# Patient Record
Sex: MALE | Race: WHITE | NOT HISPANIC OR LATINO | Employment: OTHER | URBAN - METROPOLITAN AREA
[De-identification: names, ages, dates, MRNs, and addresses within clinical notes are randomized per-mention and may not be internally consistent; named-entity substitution may affect disease eponyms.]

---

## 2023-09-07 PROBLEM — R74.8 ABNORMAL LEVELS OF OTHER SERUM ENZYMES: Status: ACTIVE | Noted: 2023-09-07

## 2023-09-07 PROBLEM — I48.0 PAROXYSMAL A-FIB (MULTI): Status: ACTIVE | Noted: 2023-09-07

## 2023-09-07 PROBLEM — K57.10 DVRTCLOS OF SM INT W/O PERFORATION OR ABSCESS W/O BLEEDING: Status: ACTIVE | Noted: 2023-09-07

## 2023-09-07 PROBLEM — K22.89 OTHER SPECIFIED DISEASE OF ESOPHAGUS: Status: ACTIVE | Noted: 2023-09-07

## 2023-09-07 PROBLEM — R19.01 ABDOMINAL WALL MASS OF RIGHT UPPER QUADRANT: Status: ACTIVE | Noted: 2023-09-07

## 2023-09-07 PROBLEM — R93.2 ABNORMAL FINDINGS ON DIAGNOSTIC IMAGING OF LIVER AND BILIARY TRACT: Status: ACTIVE | Noted: 2023-09-07

## 2023-09-07 PROBLEM — I10 BENIGN ESSENTIAL HYPERTENSION: Status: ACTIVE | Noted: 2023-09-07

## 2023-09-07 PROBLEM — I45.10 COMPLETE RIGHT BUNDLE BRANCH BLOCK (RBBB): Status: ACTIVE | Noted: 2023-09-07

## 2023-09-07 PROBLEM — I63.9 CVA (CEREBRAL VASCULAR ACCIDENT) (MULTI): Status: ACTIVE | Noted: 2023-09-07

## 2023-09-07 PROBLEM — E78.5 DYSLIPIDEMIA: Status: ACTIVE | Noted: 2023-09-07

## 2023-09-07 PROBLEM — K80.50 CALCULUS OF BILE DUCT WITHOUT CHOLANGITIS OR CHOLECYSTITIS WITHOUT OBSTRUCTION: Status: ACTIVE | Noted: 2023-09-07

## 2023-09-07 PROBLEM — K31.1: Status: ACTIVE | Noted: 2023-09-07

## 2023-09-07 RX ORDER — PANTOPRAZOLE SODIUM 40 MG/1
1 TABLET, DELAYED RELEASE ORAL DAILY
COMMUNITY
End: 2024-04-24

## 2023-09-07 RX ORDER — RIVAROXABAN 20 MG/1
TABLET, FILM COATED ORAL
COMMUNITY
Start: 2023-04-12 | End: 2024-02-07

## 2023-09-07 RX ORDER — LISINOPRIL 20 MG/1
TABLET ORAL
COMMUNITY
Start: 2022-10-18 | End: 2023-10-12 | Stop reason: ALTCHOICE

## 2023-09-07 RX ORDER — LISINOPRIL 40 MG/1
40 TABLET ORAL DAILY
COMMUNITY
End: 2023-10-12 | Stop reason: ALTCHOICE

## 2023-09-07 RX ORDER — SIMVASTATIN 40 MG/1
1 TABLET, FILM COATED ORAL NIGHTLY
COMMUNITY

## 2023-09-07 RX ORDER — UBIDECARENONE 75 MG
1 CAPSULE ORAL DAILY
COMMUNITY

## 2023-09-07 RX ORDER — LISINOPRIL AND HYDROCHLOROTHIAZIDE 20; 25 MG/1; MG/1
1 TABLET ORAL DAILY
COMMUNITY
End: 2023-10-12 | Stop reason: ALTCHOICE

## 2023-09-07 RX ORDER — CHOLECALCIFEROL (VITAMIN D3) 25 MCG
1 TABLET ORAL DAILY
COMMUNITY

## 2023-09-07 RX ORDER — AMLODIPINE BESYLATE 5 MG/1
1 TABLET ORAL DAILY
COMMUNITY
Start: 2023-02-20

## 2023-09-07 RX ORDER — IPRATROPIUM BROMIDE 21 UG/1
SPRAY, METERED NASAL
COMMUNITY
Start: 2022-10-20

## 2023-09-07 RX ORDER — TROSPIUM CHLORIDE 20 MG/1
TABLET, FILM COATED ORAL
COMMUNITY
Start: 2022-08-16

## 2023-10-12 ENCOUNTER — OFFICE VISIT (OUTPATIENT)
Dept: CARDIOLOGY | Facility: CLINIC | Age: 88
End: 2023-10-12
Payer: MEDICARE

## 2023-10-12 VITALS
WEIGHT: 186 LBS | BODY MASS INDEX: 25.19 KG/M2 | OXYGEN SATURATION: 99 % | DIASTOLIC BLOOD PRESSURE: 70 MMHG | HEART RATE: 87 BPM | SYSTOLIC BLOOD PRESSURE: 140 MMHG | HEIGHT: 72 IN

## 2023-10-12 DIAGNOSIS — I10 BENIGN ESSENTIAL HYPERTENSION: ICD-10-CM

## 2023-10-12 DIAGNOSIS — I48.0 PAROXYSMAL A-FIB (MULTI): Primary | ICD-10-CM

## 2023-10-12 DIAGNOSIS — E78.5 DYSLIPIDEMIA: ICD-10-CM

## 2023-10-12 PROCEDURE — 3078F DIAST BP <80 MM HG: CPT | Performed by: INTERNAL MEDICINE

## 2023-10-12 PROCEDURE — 1126F AMNT PAIN NOTED NONE PRSNT: CPT | Performed by: INTERNAL MEDICINE

## 2023-10-12 PROCEDURE — 99214 OFFICE O/P EST MOD 30 MIN: CPT | Performed by: INTERNAL MEDICINE

## 2023-10-12 PROCEDURE — 1159F MED LIST DOCD IN RCRD: CPT | Performed by: INTERNAL MEDICINE

## 2023-10-12 PROCEDURE — 1160F RVW MEDS BY RX/DR IN RCRD: CPT | Performed by: INTERNAL MEDICINE

## 2023-10-12 PROCEDURE — 3077F SYST BP >= 140 MM HG: CPT | Performed by: INTERNAL MEDICINE

## 2023-10-12 PROCEDURE — 1036F TOBACCO NON-USER: CPT | Performed by: INTERNAL MEDICINE

## 2023-10-12 RX ORDER — LISINOPRIL 40 MG/1
40 TABLET ORAL DAILY
Qty: 90 TABLET | Refills: 3 | Status: SHIPPED | OUTPATIENT
Start: 2023-10-12 | End: 2024-10-11

## 2023-10-12 ASSESSMENT — ENCOUNTER SYMPTOMS
DEPRESSION: 0
OCCASIONAL FEELINGS OF UNSTEADINESS: 0
LOSS OF SENSATION IN FEET: 0

## 2023-10-12 ASSESSMENT — PAIN SCALES - GENERAL: PAINLEVEL: 0-NO PAIN
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
## 2024-02-06 DIAGNOSIS — I48.0 PAROXYSMAL A-FIB (MULTI): ICD-10-CM

## 2024-02-07 RX ORDER — RIVAROXABAN 20 MG/1
TABLET, FILM COATED ORAL
Qty: 30 TABLET | Refills: 11 | Status: SHIPPED | OUTPATIENT
Start: 2024-02-07 | End: 2024-04-02 | Stop reason: SDUPTHER

## 2024-04-02 DIAGNOSIS — I10 ESSENTIAL HYPERTENSION: ICD-10-CM

## 2024-04-02 DIAGNOSIS — I48.0 PAROXYSMAL A-FIB (MULTI): ICD-10-CM

## 2024-04-24 DIAGNOSIS — K21.00 GASTROESOPHAGEAL REFLUX DISEASE WITH ESOPHAGITIS WITHOUT HEMORRHAGE: ICD-10-CM

## 2024-04-24 RX ORDER — PANTOPRAZOLE SODIUM 40 MG/1
40 TABLET, DELAYED RELEASE ORAL DAILY
Qty: 90 TABLET | Refills: 4 | Status: SHIPPED | OUTPATIENT
Start: 2024-04-24

## 2024-06-21 DIAGNOSIS — I10 ESSENTIAL HYPERTENSION, BENIGN: ICD-10-CM

## 2024-06-21 RX ORDER — AMLODIPINE BESYLATE 5 MG/1
5 TABLET ORAL DAILY
Qty: 90 TABLET | Refills: 3 | Status: SHIPPED | OUTPATIENT
Start: 2024-06-21
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
## 2024-10-17 ENCOUNTER — APPOINTMENT (OUTPATIENT)
Dept: CARDIOLOGY | Facility: CLINIC | Age: 89
End: 2024-10-17
Payer: MEDICARE

## 2024-10-17 DIAGNOSIS — I48.0 PAROXYSMAL A-FIB (MULTI): Primary | ICD-10-CM

## 2024-10-17 DIAGNOSIS — I10 BENIGN ESSENTIAL HYPERTENSION: ICD-10-CM

## 2024-10-17 DIAGNOSIS — E78.5 DYSLIPIDEMIA: ICD-10-CM
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
## 2024-11-14 ENCOUNTER — OFFICE VISIT (OUTPATIENT)
Dept: CARDIOLOGY | Facility: CLINIC | Age: 89
End: 2024-11-14
Payer: MEDICARE

## 2024-11-14 VITALS
SYSTOLIC BLOOD PRESSURE: 130 MMHG | DIASTOLIC BLOOD PRESSURE: 68 MMHG | HEIGHT: 72 IN | OXYGEN SATURATION: 99 % | HEART RATE: 80 BPM | WEIGHT: 175 LBS | BODY MASS INDEX: 23.7 KG/M2

## 2024-11-14 DIAGNOSIS — E78.5 DYSLIPIDEMIA: ICD-10-CM

## 2024-11-14 DIAGNOSIS — I45.10 COMPLETE RIGHT BUNDLE BRANCH BLOCK (RBBB): ICD-10-CM

## 2024-11-14 DIAGNOSIS — I63.9 CEREBROVASCULAR ACCIDENT (CVA), UNSPECIFIED MECHANISM (MULTI): Primary | ICD-10-CM

## 2024-11-14 DIAGNOSIS — I48.0 PAROXYSMAL A-FIB (MULTI): ICD-10-CM

## 2024-11-14 DIAGNOSIS — I10 BENIGN ESSENTIAL HYPERTENSION: Primary | ICD-10-CM

## 2024-11-14 PROCEDURE — 3075F SYST BP GE 130 - 139MM HG: CPT | Performed by: INTERNAL MEDICINE

## 2024-11-14 PROCEDURE — 1036F TOBACCO NON-USER: CPT | Performed by: INTERNAL MEDICINE

## 2024-11-14 PROCEDURE — 1126F AMNT PAIN NOTED NONE PRSNT: CPT | Performed by: INTERNAL MEDICINE

## 2024-11-14 PROCEDURE — 1159F MED LIST DOCD IN RCRD: CPT | Performed by: INTERNAL MEDICINE

## 2024-11-14 PROCEDURE — 99214 OFFICE O/P EST MOD 30 MIN: CPT | Performed by: INTERNAL MEDICINE

## 2024-11-14 PROCEDURE — 3078F DIAST BP <80 MM HG: CPT | Performed by: INTERNAL MEDICINE

## 2024-11-14 PROCEDURE — 1160F RVW MEDS BY RX/DR IN RCRD: CPT | Performed by: INTERNAL MEDICINE

## 2024-11-14 PROCEDURE — 1157F ADVNC CARE PLAN IN RCRD: CPT | Performed by: INTERNAL MEDICINE

## 2024-11-14 ASSESSMENT — PAIN SCALES - GENERAL: PAINLEVEL_OUTOF10: 0-NO PAIN

## 2024-11-14 ASSESSMENT — ENCOUNTER SYMPTOMS
LOSS OF SENSATION IN FEET: 0
OCCASIONAL FEELINGS OF UNSTEADINESS: 1
DEPRESSION: 0

## 2024-12-12 DIAGNOSIS — I10 BENIGN ESSENTIAL HYPERTENSION: ICD-10-CM

## 2024-12-12 RX ORDER — LISINOPRIL 40 MG/1
40 TABLET ORAL DAILY
Qty: 90 TABLET | Refills: 3 | Status: SHIPPED | OUTPATIENT
Start: 2024-12-12

## 2024-12-13 ENCOUNTER — TELEPHONE (OUTPATIENT)
Dept: CARDIOLOGY | Facility: CLINIC | Age: 89
End: 2024-12-13
Payer: MEDICARE

## 2024-12-17 ENCOUNTER — APPOINTMENT (OUTPATIENT)
Dept: PHARMACY | Facility: HOSPITAL | Age: 89
End: 2024-12-17
Payer: MEDICARE

## 2025-01-06 NOTE — PROGRESS NOTES
Pharmacist Clinic: Cardiology Management    Dash García is a 89 y.o. male was referred to Clinical Pharmacy Team for anticoagulation management.     Referring Provider: Dash Quick MD    THIS IS A NEW PATIENT APPOINTMENT. PATIENT WILL BE ESTABLISHING CARE WITH CLINICAL PHARMACY.    Appointment was completed by the patient who was reached at .    REVIEW OF PAST APPNT (IF APPLICABLE):   N/A    Allergies Reviewed? 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    Personal history of malignant neoplasm of prostate     History of malignant neoplasm of prostate    Personal history of other diseases of the circulatory system 07/30/2020    History of abnormal electrocardiography    Personal history of other diseases of the circulatory system 07/30/2020    History of cardiac murmur    Personal history of other diseases of the circulatory system 07/30/2020    History of essential hypertension    Personal history of other diseases of the digestive system     History of gastrointestinal hemorrhage    Personal history of other endocrine, nutritional and metabolic disease     History of diabetes mellitus    Transient cerebral ischemic attack, unspecified 07/30/2020    TIA (transient ischemic attack)       Current Outpatient Medications on File Prior to Visit   Medication Sig Dispense Refill    amLODIPine (Norvasc) 5 mg tablet TAKE ONE TABLET BY MOUTH EVERY DAY 90 tablet 3    lisinopril 40 mg tablet TAKE ONE TABLET BY MOUTH EVERY DAY 90 tablet 3    pantoprazole (ProtoNix) 40 mg EC tablet TAKE ONE TABLET BY MOUTH EVERY DAY 90 tablet 4    rivaroxaban (Xarelto) 15 mg tablet Take 1 tablet (15 mg) by mouth once daily in the evening. Take with meals. Take with food. 30 tablet 11    simvastatin  "(Zocor) 40 mg tablet Take 1 tablet (40 mg) by mouth once daily at bedtime.      trospium (Sanctura) 20 mg tablet Take by mouth. (Patient not taking: Reported on 1/7/2025)       No current facility-administered medications on file prior to visit.         RELEVANT LAB RESULTS:  Lab Results   Component Value Date    BILITOT 1.6 (H) 08/30/2022    CALCIUM 9.1 08/30/2022    CO2 21 08/30/2022     08/30/2022    CREATININE 1.88 (H) 08/30/2022    GLUCOSE 122 (H) 08/30/2022    ALKPHOS 335 (H) 08/30/2022    K 3.4 (L) 08/30/2022    PROT 6.3 (L) 08/30/2022     08/30/2022    AST 13 08/30/2022    ALT 26 08/30/2022    BUN 19 08/30/2022    ANIONGAP 16 08/30/2022    ALBUMIN 3.2 (L) 08/30/2022    LIPASE 66 08/26/2022    GFRMALE 35 (A) 08/30/2022     No results found for: \"TRIG\", \"CHOL\", \"LDLCALC\", \"HDL\"  No results found for: \"BMCBC\", \"CBCDIF\"     PHARMACEUTICAL ASSESSMENT:    MEDICATION RECONCILIATION    Home Pharmacy Reviewed? Yes, describe: [facility]; [facility]    Added:  - None    Changed:  - Trospium 20 mg: patient states not taking    Removed:  - None    Drug Interactions? Yes, describe: Amlodipine may increase serum concentrations of simvastatin. Recommended to limit simvastatin dosage to 20 mg daily. Close laboratory and clinical monitoring recommended for signs of rhabdomyolysis (creatine phosphokinase, muscle aches and pains). Other statin therapy may be beneficial that may not have clinically significant interactions with amlodipine (atorvastatin, fluvastatin, pitavastatin, pravastatin, and rosuvastatin). Patient reports no symptoms at this time.      Medication Documentation Review Audit       Reviewed by Monserrat Castrejon PharmD (Pharmacist) on 01/07/25 at 0927      Medication Order Taking? Sig Documenting Provider Last Dose Status   amLODIPine (Norvasc) 5 mg tablet [id number] Yes TAKE ONE TABLET BY MOUTH EVERY DAY Dash Quick MD  Active   lisinopril 40 mg tablet [id number] Yes TAKE ONE TABLET BY MOUTH " EVERY DAY Dash Quick MD  Active   pantoprazole (ProtoNix) 40 mg EC tablet [id number] Yes TAKE ONE TABLET BY MOUTH EVERY DAY Dash Quick MD  Active   rivaroxaban (Xarelto) 15 mg tablet [id number] Yes Take 1 tablet (15 mg) by mouth once daily in the evening. Take with meals. Take with food. Dash Quick MD  Active   simvastatin (Zocor) 40 mg tablet [id number] Yes Take 1 tablet (40 mg) by mouth once daily at bedtime. Historical Provider, MD Taking Active   trospium (Sanctura) 20 mg tablet [id number] No Take by mouth.   Patient not taking: Reported on 1/7/2025    Historical Provider, MD Taking Active                    DISEASE MANAGEMENT ASSESSMENT:     ANTICOAGULATION ASSESSMENT    The ASCVD Risk score (Jade HAMPTON, et al., 2019) failed to calculate for the following reasons:    The 2019 ASCVD risk score is only valid for ages 40 to 79    Risk score cannot be calculated because patient has a medical history suggesting prior/existing ASCVD    DIAGNOSIS: prevention of nonvalvular atrial fibrilliation stroke and systemic embolism  - Patient is projected to be on anticoagulation long term  - [id number] Score: [6] (only included if diagnosis is atrial fibrillation)   Age: [<65 (0)] [65-74 (+1)] [> 75 (+2)]: 2  Sex: [Male/Female (+1)]: 0  CHF history: [No/Yes(+1)]: 0  Hypertension history: [No/Yes(+1)]: 1  Stroke/TIA/thromboembolism history: [No/Yes(+2)]: 2  Vascular disease history (prior MI, peripheral artery disease, aortic plaque): [No/Yes(+1)]: 0  Diabetes history: [No/Yes(+1)]: 1    CURRENT PHARMACOTHERAPY:   Xarelto 15 mg every day   CrCl 31 mL/min  89 y/o  79.4 kg  Scr 1.89 mg/dL (7/18/2024 -external lab)  Dosage is appropriate per above criteria    RELEVANT PAST MEDICAL HISTORY:   A-fib, DLD, HTN, Hx of CVA, Hx of GI bleed    Affordability/Accessibility: Xarelto creates cost burden for patient; reports having 11 days of medication remaining  Adherence/Organization: confirms taking Xarelto once daily in the  morning with food  Adverse Reactions: no abnormal bruising or bleeding  Recent Hospitalizations: none  Recent Falls/Trauma: none  Changes in Tobacco or Alcohol Intake:   Tobacco: none, does not use  Alcohol: will have 1 cocktail daily prior to dinner; does not typically exceed 1 beverage     EDUCATION/COUNSELING:   - Counseled patient on MOA, expectations, duration of therapy, contraindications, administration, and monitoring parameters  - Counseled patient of side effects that are indicative of bleeding such as dark tarry stool, unexplainable bruising, or vomiting up a coffee ground like substance     Patient Assistance Program (PAP)    Application for program to be submitted for the following medications: Xarelto    [facility] Permanent Address: [facility]   Prescription Insurance:   Yes   Members of Household: 2   Files Taxes: Yes - files jointly with spouse       Patient will be faxing financial information to pharmacist directly at .    Patient verbally reports monthly or yearly income which is less than 400% federal poverty level    Patient aware this process may take up to 6 weeks.     If approved medication must be filled through [facility] and MEDICATION WILL BE MAILED TO PATIENT.    DISCUSSION/NOTES:   Today's appointment was initial visit to establish care with Clinical Pharmacy. Dash is doing well on anticoagulation with Xarelto, reports no abnormal bruising or bleeding.   Will apply for  PAP for cost assistance and follow up in 1 month to ensure program approval.     ASSESSMENT:    Assessment/Plan   Problem List Items Addressed This Visit       Paroxysmal A-fib (Multi)     Dash continues on anticoagulation with Xarelto. Dosage is appropriate per current renal function.         Relevant Orders    Referral to Clinical Pharmacy     RECOMMENDATIONS/PLAN:    Continue: Xarelto 15 mg every day   Follow up: 1 month    Last Appnt with Referring Provider: 11/14/2024  Next Appnt with  Referring Provider: 11/20/2025  Clinical Pharmacist follow up: 2/10/2025  VAF/Application Expiration: Yes    Date: Pending  Type of Encounter: Suhail Castrejon PharmD    Verbal consent to manage patient's drug therapy was obtained from the patient . They were informed they may decline to participate or withdraw from participation in pharmacy services at any time.    Continue all meds under the continuation of care with the referring provider and clinical pharmacy team.

## 2025-01-07 ENCOUNTER — APPOINTMENT (OUTPATIENT)
Dept: PHARMACY | Facility: HOSPITAL | Age: 89
End: 2025-01-07
Payer: MEDICARE

## 2025-01-07 DIAGNOSIS — I48.0 PAROXYSMAL A-FIB (MULTI): ICD-10-CM

## 2025-01-07 NOTE — Clinical Note
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 Will apply for [facility] for cost assistance and follow up in 1 month to ensure program approval.

## 2025-02-10 ENCOUNTER — APPOINTMENT (OUTPATIENT)
Dept: PHARMACY | Facility: HOSPITAL | Age: 89
End: 2025-02-10
Payer: MEDICARE

## 2025-02-10 ENCOUNTER — TELEPHONE (OUTPATIENT)
Dept: PHARMACY | Facility: HOSPITAL | Age: 89
End: 2025-02-10

## 2025-02-10 PROCEDURE — RXMED WILLOW AMBULATORY MEDICATION CHARGE

## 2025-02-10 NOTE — TELEPHONE ENCOUNTER
Patient Assistance Program Approval:     We are pleased to inform you that your application for assistance has been approved.     This approval is valid through  2/10/2026  as long as the following criteria continue to be satisfied:     Your medication (Xarelto) remains covered under your current insurance plan.   Your prescriber does not discontinue therapy.   You do not seek reimbursement from any other private or government-funded programs for the  medication.    Under this program, the pharmacy will first bill your insurance plan for your indemnified specified medication. The [employer] Assistance Fund will then offset your copay balance, so that your out-of pocket expense for your specialty medication will be $0.00.    Monserrat Castrejon, PharmD

## 2025-02-10 NOTE — PROGRESS NOTES
Pharmacist Clinic: Cardiology Management    Dash García is a 89 y.o. male was referred to Clinical Pharmacy Team for anticoagulation management.     Referring Provider: Dash Quick MD    THIS IS A FOLLOW UP PATIENT APPOINTMENT. AT LAST VISIT ON 1/7/2025 WITH PHARMACIST (Monserrat Castrejon).    Appointment was completed by the patient who was reached at .    REVIEW OF PAST APPNT (IF APPLICABLE):   N/A    Allergies Reviewed? 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    Personal history of malignant neoplasm of prostate     History of malignant neoplasm of prostate    Personal history of other diseases of the circulatory system 07/30/2020    History of abnormal electrocardiography    Personal history of other diseases of the circulatory system 07/30/2020    History of cardiac murmur    Personal history of other diseases of the circulatory system 07/30/2020    History of essential hypertension    Personal history of other diseases of the digestive system     History of gastrointestinal hemorrhage    Personal history of other endocrine, nutritional and metabolic disease     History of diabetes mellitus    Transient cerebral ischemic attack, unspecified 07/30/2020    TIA (transient ischemic attack)       Current Outpatient Medications on File Prior to Visit   Medication Sig Dispense Refill    amLODIPine (Norvasc) 5 mg tablet TAKE ONE TABLET BY MOUTH EVERY DAY 90 tablet 3    lisinopril 40 mg tablet TAKE ONE TABLET BY MOUTH EVERY DAY 90 tablet 3    pantoprazole (ProtoNix) 40 mg EC tablet TAKE ONE TABLET BY MOUTH EVERY DAY 90 tablet 4    rivaroxaban (Xarelto) 15 mg tablet Take 1 tablet (15 mg) by mouth once daily in the evening. Take with meals. Take with food. 90 tablet 3    simvastatin  "(Zocor) 40 mg tablet Take 1 tablet (40 mg) by mouth once daily at bedtime.      trospium (Sanctura) 20 mg tablet Take by mouth. (Patient not taking: Reported on 1/7/2025)       No current facility-administered medications on file prior to visit.         RELEVANT LAB RESULTS:  Lab Results   Component Value Date    BILITOT 1.6 (H) 08/30/2022    CALCIUM 9.1 08/30/2022    CO2 21 08/30/2022     08/30/2022    CREATININE 1.88 (H) 08/30/2022    GLUCOSE 122 (H) 08/30/2022    ALKPHOS 335 (H) 08/30/2022    K 3.4 (L) 08/30/2022    PROT 6.3 (L) 08/30/2022     08/30/2022    AST 13 08/30/2022    ALT 26 08/30/2022    BUN 19 08/30/2022    ANIONGAP 16 08/30/2022    ALBUMIN 3.2 (L) 08/30/2022    LIPASE 66 08/26/2022    GFRMALE 35 (A) 08/30/2022     No results found for: \"TRIG\", \"CHOL\", \"LDLCALC\", \"HDL\"  No results found for: \"BMCBC\", \"CBCDIF\"     PHARMACEUTICAL ASSESSMENT:    MEDICATION RECONCILIATION    Drug Interactions? Yes, describe: Amlodipine may increase serum concentrations of simvastatin. Recommended to limit simvastatin dosage to 20 mg daily. Close laboratory and clinical monitoring recommended for signs of rhabdomyolysis (creatine phosphokinase, muscle aches and pains). Other statin therapy may be beneficial that may not have clinically significant interactions with amlodipine (atorvastatin, fluvastatin, pitavastatin, pravastatin, and rosuvastatin). Patient previously reported no symptoms.      Medication Documentation Review Audit       Reviewed by Monserrat Castrejon PharmD (Pharmacist) on 01/07/25 at 0927      Medication Order Taking? Sig Documenting Provider Last Dose Status   amLODIPine (Norvasc) 5 mg tablet [id number] Yes TAKE ONE TABLET BY MOUTH EVERY DAY Dash Quick MD  Active   lisinopril 40 mg tablet [id number] Yes TAKE ONE TABLET BY MOUTH EVERY DAY Dash Quick MD  Active   pantoprazole (ProtoNix) 40 mg EC tablet [id number] Yes TAKE ONE TABLET BY MOUTH EVERY DAY Dash Quick MD  Active   rivaroxaban " (Xarelto) 15 mg tablet [id number] Yes Take 1 tablet (15 mg) by mouth once daily in the evening. Take with meals. Take with food. Dash Qiuck MD  Active   simvastatin (Zocor) 40 mg tablet [id number] Yes Take 1 tablet (40 mg) by mouth once daily at bedtime. Historical Provider, MD Taking Active   trospium (Sanctura) 20 mg tablet [id number] No Take by mouth.   Patient not taking: Reported on 1/7/2025    Historical Provider, MD Taking Active                    DISEASE MANAGEMENT ASSESSMENT:     ANTICOAGULATION ASSESSMENT    The ASCVD Risk score (Jade HAMPTON, et al., 2019) failed to calculate for the following reasons:    The 2019 ASCVD risk score is only valid for ages 40 to 79    Risk score cannot be calculated because patient has a medical history suggesting prior/existing ASCVD    DIAGNOSIS: prevention of nonvalvular atrial fibrilliation stroke and systemic embolism  - Patient is projected to be on anticoagulation long term  - [id number] Score: [6] (only included if diagnosis is atrial fibrillation)   Age: [<65 (0)] [65-74 (+1)] [> 75 (+2)]: 2  Sex: [Male/Female (+1)]: 0  CHF history: [No/Yes(+1)]: 0  Hypertension history: [No/Yes(+1)]: 1  Stroke/TIA/thromboembolism history: [No/Yes(+2)]: 2  Vascular disease history (prior MI, peripheral artery disease, aortic plaque): [No/Yes(+1)]: 0  Diabetes history: [No/Yes(+1)]: 1    CURRENT PHARMACOTHERAPY:   Xarelto 15 mg every day   CrCl 31 mL/min  88 y/o  79.4 kg  Scr 1.89 mg/dL (7/18/2024 -external lab)  Dosage is appropriate per above criteria    RELEVANT PAST MEDICAL HISTORY:   A-fib, DLD, HTN, Hx of CVA, Hx of GI bleed    Affordability/Accessibility: Xarelto creates cost burden for patient;  PAP application submitted  Adherence/Organization: confirms taking Xarelto once daily in the morning with food  Adverse Reactions: no abnormal bruising or bleeding; no dark/tarry stools  Recent Hospitalizations: none  Recent Falls/Trauma: none  Changes in Tobacco or Alcohol  Intake:   Tobacco: none, does not use  Alcohol: will have 1 cocktail daily prior to dinner; does not typically exceed 1 beverage     EDUCATION/COUNSELING:   - Counseled patient on MOA, expectations, duration of therapy, contraindications, administration, and monitoring parameters  - Counseled patient of side effects that are indicative of bleeding such as dark tarry stool, unexplainable bruising, or vomiting up a coffee ground like substance      DISCUSSION/NOTES:   Today's appointment was 1 month follow up regarding anticoagulation with Xarelto. Dash is doing well, reports no abnormal bruising or bleeding. No recent hospitalizations or falls. Discussed seeking medical attention for any falls in the future, particularly if he were to hit his head, due to the increased risk of bleeding with Xarelto.  Will follow up in 6 months, patient given pharmacist's phone number for any future questions/concerns.     ASSESSMENT:    Assessment/Plan   Problem List Items Addressed This Visit       Paroxysmal A-fib (Multi)     Dash continues on anticoagulation with Xarelto. Dosage is appropriate for current renal function.         Relevant Orders    Referral to Clinical Pharmacy       RECOMMENDATIONS/PLAN:    Continue: Xarelto 15 mg every day   Follow up: 6 months    Last Appnt with Referring Provider: 11/14/2024  Next Appnt with Referring Provider: 11/20/2025  Clinical Pharmacist follow up: 8/13/2025  VAF/Application Expiration: Yes    Date: 2/10/2025  Type of Encounter: Suhail Castrejon PharmD    Verbal consent to manage patient's drug therapy was obtained from the patient . They were informed they may decline to participate or withdraw from participation in pharmacy services at any time.    Continue all meds under the continuation of care with the referring provider and clinical pharmacy team.

## 2025-02-12 ENCOUNTER — PHARMACY VISIT (OUTPATIENT)
Dept: PHARMACY | Facility: CLINIC | Age: 89
End: 2025-02-12
Payer: COMMERCIAL

## 2025-02-12 ENCOUNTER — TELEMEDICINE (OUTPATIENT)
Dept: PHARMACY | Facility: HOSPITAL | Age: 89
End: 2025-02-12
Payer: MEDICARE

## 2025-02-12 DIAGNOSIS — I48.0 PAROXYSMAL A-FIB (MULTI): ICD-10-CM

## 2025-02-12 NOTE — Clinical Note
Lane Quick, Today's appointment was 1 month follow up regarding anticoagulation with Xarelto. Dash is doing well, reports no abnormal bruising or bleeding. No recent hospitalizations or falls. No changes today, will follow up in 6 months.

## 2025-05-05 PROCEDURE — RXMED WILLOW AMBULATORY MEDICATION CHARGE

## 2025-05-06 ENCOUNTER — PHARMACY VISIT (OUTPATIENT)
Dept: PHARMACY | Facility: CLINIC | Age: 89
End: 2025-05-06
Payer: COMMERCIAL

## 2025-06-19 DIAGNOSIS — K21.00 GASTROESOPHAGEAL REFLUX DISEASE WITH ESOPHAGITIS WITHOUT HEMORRHAGE: ICD-10-CM

## 2025-06-19 RX ORDER — PANTOPRAZOLE SODIUM 40 MG/1
40 TABLET, DELAYED RELEASE ORAL DAILY
Qty: 90 TABLET | Refills: 3 | Status: SHIPPED | OUTPATIENT
Start: 2025-06-19

## 2025-07-17 PROCEDURE — RXMED WILLOW AMBULATORY MEDICATION CHARGE

## 2025-07-19 ENCOUNTER — PHARMACY VISIT (OUTPATIENT)
Dept: PHARMACY | Facility: CLINIC | Age: 89
End: 2025-07-19
Payer: COMMERCIAL

## 2025-07-20 ENCOUNTER — APPOINTMENT (OUTPATIENT)
Dept: CARDIOLOGY | Facility: HOSPITAL | Age: 89
End: 2025-07-20
Payer: MEDICARE

## 2025-07-20 ENCOUNTER — APPOINTMENT (OUTPATIENT)
Dept: RADIOLOGY | Facility: HOSPITAL | Age: 89
End: 2025-07-20
Payer: MEDICARE

## 2025-07-20 ENCOUNTER — HOSPITAL ENCOUNTER (INPATIENT)
Facility: HOSPITAL | Age: 89
End: 2025-07-20
Attending: EMERGENCY MEDICINE | Admitting: STUDENT IN AN ORGANIZED HEALTH CARE EDUCATION/TRAINING PROGRAM
Payer: MEDICARE

## 2025-07-20 VITALS
HEART RATE: 68 BPM | SYSTOLIC BLOOD PRESSURE: 146 MMHG | DIASTOLIC BLOOD PRESSURE: 91 MMHG | BODY MASS INDEX: 21.67 KG/M2 | WEIGHT: 160 LBS | OXYGEN SATURATION: 96 % | RESPIRATION RATE: 18 BRPM | TEMPERATURE: 97.2 F | HEIGHT: 72 IN

## 2025-07-20 DIAGNOSIS — I49.8 BIGEMINY: ICD-10-CM

## 2025-07-20 DIAGNOSIS — M00.9 PYOGENIC ARTHRITIS OF LEFT ELBOW, DUE TO UNSPECIFIED ORGANISM (MULTI): Primary | ICD-10-CM

## 2025-07-20 DIAGNOSIS — M25.422 JOINT EFFUSION OF ELBOW, LEFT: ICD-10-CM

## 2025-07-20 PROBLEM — L03.114 CELLULITIS OF LEFT ELBOW: Status: ACTIVE | Noted: 2025-07-20

## 2025-07-20 LAB
ALBUMIN SERPL BCP-MCNC: 4.2 G/DL (ref 3.4–5)
ALP SERPL-CCNC: 82 U/L (ref 33–136)
ALT SERPL W P-5'-P-CCNC: 7 U/L (ref 10–52)
ANION GAP SERPL CALC-SCNC: 16 MMOL/L (ref 10–20)
AST SERPL W P-5'-P-CCNC: 13 U/L (ref 9–39)
BASOPHILS # BLD AUTO: 0.04 X10*3/UL (ref 0–0.1)
BASOPHILS NFR BLD AUTO: 0.4 %
BILIRUB SERPL-MCNC: 1.7 MG/DL (ref 0–1.2)
BNP SERPL-MCNC: 354 PG/ML (ref 0–99)
BUN SERPL-MCNC: 21 MG/DL (ref 6–23)
CALCIUM SERPL-MCNC: 10.2 MG/DL (ref 8.6–10.3)
CARDIAC TROPONIN I PNL SERPL HS: 6 NG/L (ref 0–20)
CARDIAC TROPONIN I PNL SERPL HS: 7 NG/L (ref 0–20)
CHLORIDE SERPL-SCNC: 102 MMOL/L (ref 98–107)
CO2 SERPL-SCNC: 23 MMOL/L (ref 21–32)
CREAT SERPL-MCNC: 1.65 MG/DL (ref 0.5–1.3)
EGFRCR SERPLBLD CKD-EPI 2021: 40 ML/MIN/1.73M*2
EOSINOPHIL # BLD AUTO: 0.01 X10*3/UL (ref 0–0.4)
EOSINOPHIL NFR BLD AUTO: 0.1 %
ERYTHROCYTE [DISTWIDTH] IN BLOOD BY AUTOMATED COUNT: 15.8 % (ref 11.5–14.5)
GLUCOSE SERPL-MCNC: 128 MG/DL (ref 74–99)
HCT VFR BLD AUTO: 44.3 % (ref 41–52)
HGB BLD-MCNC: 14.4 G/DL (ref 13.5–17.5)
IMM GRANULOCYTES # BLD AUTO: 0.03 X10*3/UL (ref 0–0.5)
IMM GRANULOCYTES NFR BLD AUTO: 0.3 % (ref 0–0.9)
INR PPP: 1.2 (ref 0.9–1.1)
LYMPHOCYTES # BLD AUTO: 0.83 X10*3/UL (ref 0.8–3)
LYMPHOCYTES NFR BLD AUTO: 8.3 %
MAGNESIUM SERPL-MCNC: 1.58 MG/DL (ref 1.6–2.4)
MCH RBC QN AUTO: 27.6 PG (ref 26–34)
MCHC RBC AUTO-ENTMCNC: 32.5 G/DL (ref 32–36)
MCV RBC AUTO: 85 FL (ref 80–100)
MONOCYTES # BLD AUTO: 0.93 X10*3/UL (ref 0.05–0.8)
MONOCYTES NFR BLD AUTO: 9.3 %
NEUTROPHILS # BLD AUTO: 8.21 X10*3/UL (ref 1.6–5.5)
NEUTROPHILS NFR BLD AUTO: 81.6 %
NRBC BLD-RTO: 0 /100 WBCS (ref 0–0)
PLATELET # BLD AUTO: 210 X10*3/UL (ref 150–450)
POTASSIUM SERPL-SCNC: 3.9 MMOL/L (ref 3.5–5.3)
PROT SERPL-MCNC: 8 G/DL (ref 6.4–8.2)
PROTHROMBIN TIME: 12.8 SECONDS (ref 9.8–12.4)
RBC # BLD AUTO: 5.21 X10*6/UL (ref 4.5–5.9)
SODIUM SERPL-SCNC: 137 MMOL/L (ref 136–145)
WBC # BLD AUTO: 10.1 X10*3/UL (ref 4.4–11.3)

## 2025-07-20 PROCEDURE — 85610 PROTHROMBIN TIME: CPT | Performed by: EMERGENCY MEDICINE

## 2025-07-20 PROCEDURE — 2500000001 HC RX 250 WO HCPCS SELF ADMINISTERED DRUGS (ALT 637 FOR MEDICARE OP): Performed by: STUDENT IN AN ORGANIZED HEALTH CARE EDUCATION/TRAINING PROGRAM

## 2025-07-20 PROCEDURE — 83880 ASSAY OF NATRIURETIC PEPTIDE: CPT | Performed by: EMERGENCY MEDICINE

## 2025-07-20 PROCEDURE — 2500000002 HC RX 250 W HCPCS SELF ADMINISTERED DRUGS (ALT 637 FOR MEDICARE OP, ALT 636 FOR OP/ED): Performed by: STUDENT IN AN ORGANIZED HEALTH CARE EDUCATION/TRAINING PROGRAM

## 2025-07-20 PROCEDURE — 80053 COMPREHEN METABOLIC PANEL: CPT | Performed by: EMERGENCY MEDICINE

## 2025-07-20 PROCEDURE — 71045 X-RAY EXAM CHEST 1 VIEW: CPT

## 2025-07-20 PROCEDURE — 1200000002 HC GENERAL ROOM WITH TELEMETRY DAILY

## 2025-07-20 PROCEDURE — 71045 X-RAY EXAM CHEST 1 VIEW: CPT | Performed by: RADIOLOGY

## 2025-07-20 PROCEDURE — 85025 COMPLETE CBC W/AUTO DIFF WBC: CPT | Performed by: EMERGENCY MEDICINE

## 2025-07-20 PROCEDURE — 83735 ASSAY OF MAGNESIUM: CPT | Performed by: STUDENT IN AN ORGANIZED HEALTH CARE EDUCATION/TRAINING PROGRAM

## 2025-07-20 PROCEDURE — 99285 EMERGENCY DEPT VISIT HI MDM: CPT | Mod: 25 | Performed by: EMERGENCY MEDICINE

## 2025-07-20 PROCEDURE — 73070 X-RAY EXAM OF ELBOW: CPT | Mod: LT

## 2025-07-20 PROCEDURE — 87040 BLOOD CULTURE FOR BACTERIA: CPT | Mod: STJLAB | Performed by: STUDENT IN AN ORGANIZED HEALTH CARE EDUCATION/TRAINING PROGRAM

## 2025-07-20 PROCEDURE — 2500000004 HC RX 250 GENERAL PHARMACY W/ HCPCS (ALT 636 FOR OP/ED): Performed by: STUDENT IN AN ORGANIZED HEALTH CARE EDUCATION/TRAINING PROGRAM

## 2025-07-20 PROCEDURE — 96367 TX/PROPH/DG ADDL SEQ IV INF: CPT

## 2025-07-20 PROCEDURE — 96365 THER/PROPH/DIAG IV INF INIT: CPT

## 2025-07-20 PROCEDURE — 99223 1ST HOSP IP/OBS HIGH 75: CPT | Performed by: STUDENT IN AN ORGANIZED HEALTH CARE EDUCATION/TRAINING PROGRAM

## 2025-07-20 PROCEDURE — 93005 ELECTROCARDIOGRAM TRACING: CPT

## 2025-07-20 PROCEDURE — 84484 ASSAY OF TROPONIN QUANT: CPT | Performed by: EMERGENCY MEDICINE

## 2025-07-20 PROCEDURE — 99285 EMERGENCY DEPT VISIT HI MDM: CPT | Performed by: EMERGENCY MEDICINE

## 2025-07-20 PROCEDURE — 96366 THER/PROPH/DIAG IV INF ADDON: CPT

## 2025-07-20 PROCEDURE — 36415 COLL VENOUS BLD VENIPUNCTURE: CPT | Performed by: EMERGENCY MEDICINE

## 2025-07-20 PROCEDURE — 73070 X-RAY EXAM OF ELBOW: CPT | Mod: LEFT SIDE | Performed by: RADIOLOGY

## 2025-07-20 RX ORDER — PANTOPRAZOLE SODIUM 40 MG/1
40 TABLET, DELAYED RELEASE ORAL EVERY EVENING
Status: DISCONTINUED | OUTPATIENT
Start: 2025-07-20 | End: 2025-07-24 | Stop reason: HOSPADM

## 2025-07-20 RX ORDER — VANCOMYCIN HYDROCHLORIDE 750 MG/150ML
750 INJECTION, SOLUTION INTRAVENOUS EVERY 24 HOURS
Status: DISCONTINUED | OUTPATIENT
Start: 2025-07-21 | End: 2025-07-21

## 2025-07-20 RX ORDER — LISINOPRIL 40 MG/1
40 TABLET ORAL DAILY
Status: DISCONTINUED | OUTPATIENT
Start: 2025-07-21 | End: 2025-07-24 | Stop reason: HOSPADM

## 2025-07-20 RX ORDER — VANCOMYCIN HYDROCHLORIDE 1 G/20ML
INJECTION, POWDER, LYOPHILIZED, FOR SOLUTION INTRAVENOUS DAILY PRN
Status: DISCONTINUED | OUTPATIENT
Start: 2025-07-20 | End: 2025-07-21

## 2025-07-20 RX ORDER — POLYETHYLENE GLYCOL 3350 17 G/17G
17 POWDER, FOR SOLUTION ORAL DAILY
Status: DISCONTINUED | OUTPATIENT
Start: 2025-07-20 | End: 2025-07-24 | Stop reason: HOSPADM

## 2025-07-20 RX ORDER — ACETAMINOPHEN 160 MG/5ML
650 SOLUTION ORAL EVERY 4 HOURS PRN
Status: DISCONTINUED | OUTPATIENT
Start: 2025-07-20 | End: 2025-07-21

## 2025-07-20 RX ORDER — AMLODIPINE BESYLATE 5 MG/1
5 TABLET ORAL DAILY
Status: DISCONTINUED | OUTPATIENT
Start: 2025-07-21 | End: 2025-07-24 | Stop reason: HOSPADM

## 2025-07-20 RX ORDER — ACETAMINOPHEN 650 MG/1
650 SUPPOSITORY RECTAL EVERY 4 HOURS PRN
Status: DISCONTINUED | OUTPATIENT
Start: 2025-07-20 | End: 2025-07-24 | Stop reason: HOSPADM

## 2025-07-20 RX ORDER — ACETAMINOPHEN 650 MG/1
650 SUPPOSITORY RECTAL EVERY 4 HOURS PRN
Status: DISCONTINUED | OUTPATIENT
Start: 2025-07-20 | End: 2025-07-21

## 2025-07-20 RX ORDER — VANCOMYCIN/0.9 % SOD CHLORIDE 1.5G/250ML
1.5 PLASTIC BAG, INJECTION (ML) INTRAVENOUS ONCE
Status: COMPLETED | OUTPATIENT
Start: 2025-07-20 | End: 2025-07-20

## 2025-07-20 RX ORDER — SIMVASTATIN 40 MG/1
40 TABLET, FILM COATED ORAL NIGHTLY
Status: DISCONTINUED | OUTPATIENT
Start: 2025-07-20 | End: 2025-07-24 | Stop reason: HOSPADM

## 2025-07-20 RX ORDER — ACETAMINOPHEN 160 MG/5ML
650 SOLUTION ORAL EVERY 4 HOURS PRN
Status: DISCONTINUED | OUTPATIENT
Start: 2025-07-20 | End: 2025-07-24 | Stop reason: HOSPADM

## 2025-07-20 RX ORDER — ACETAMINOPHEN 325 MG/1
650 TABLET ORAL EVERY 4 HOURS PRN
Status: DISCONTINUED | OUTPATIENT
Start: 2025-07-20 | End: 2025-07-21

## 2025-07-20 RX ORDER — ACETAMINOPHEN 325 MG/1
650 TABLET ORAL EVERY 4 HOURS PRN
Status: DISCONTINUED | OUTPATIENT
Start: 2025-07-20 | End: 2025-07-24 | Stop reason: HOSPADM

## 2025-07-20 RX ORDER — PYRIDOXINE HCL (VITAMIN B6) 100 MG
100 TABLET ORAL EVERY EVENING
COMMUNITY

## 2025-07-20 RX ADMIN — PIPERACILLIN SODIUM AND TAZOBACTAM SODIUM 3.38 G: 3; .375 INJECTION, SOLUTION INTRAVENOUS at 20:14

## 2025-07-20 RX ADMIN — PIPERACILLIN SODIUM AND TAZOBACTAM SODIUM 4.5 G: 4; .5 INJECTION, SOLUTION INTRAVENOUS at 15:12

## 2025-07-20 RX ADMIN — SIMVASTATIN 40 MG: 40 TABLET, FILM COATED ORAL at 20:13

## 2025-07-20 RX ADMIN — Medication 1.5 G: at 16:05

## 2025-07-20 RX ADMIN — PANTOPRAZOLE SODIUM 40 MG: 40 TABLET, DELAYED RELEASE ORAL at 20:13

## 2025-07-20 SDOH — HEALTH STABILITY: PHYSICAL HEALTH
ON AVERAGE, HOW MANY DAYS PER WEEK DO YOU ENGAGE IN MODERATE TO STRENUOUS EXERCISE (LIKE A BRISK WALK)?: PATIENT UNABLE TO ANSWER

## 2025-07-20 SDOH — HEALTH STABILITY: PHYSICAL HEALTH: ON AVERAGE, HOW MANY MINUTES DO YOU ENGAGE IN EXERCISE AT THIS LEVEL?: PATIENT UNABLE TO ANSWER

## 2025-07-20 SDOH — SOCIAL STABILITY: SOCIAL INSECURITY
WITHIN THE LAST YEAR, HAVE YOU BEEN HUMILIATED OR EMOTIONALLY ABUSED IN OTHER WAYS BY YOUR PARTNER OR EX-PARTNER?: PATIENT UNABLE TO ANSWER

## 2025-07-20 SDOH — SOCIAL STABILITY: SOCIAL INSECURITY
WITHIN THE LAST YEAR, HAVE YOU BEEN RAPED OR FORCED TO HAVE ANY KIND OF SEXUAL ACTIVITY BY YOUR PARTNER OR EX-PARTNER?: PATIENT UNABLE TO ANSWER

## 2025-07-20 SDOH — ECONOMIC STABILITY: HOUSING INSECURITY: IN THE PAST 12 MONTHS, HOW MANY TIMES HAVE YOU MOVED WHERE YOU WERE LIVING?: 0

## 2025-07-20 SDOH — SOCIAL STABILITY: SOCIAL INSECURITY: WITHIN THE LAST YEAR, HAVE YOU BEEN AFRAID OF YOUR PARTNER OR EX-PARTNER?: PATIENT UNABLE TO ANSWER

## 2025-07-20 SDOH — ECONOMIC STABILITY: INCOME INSECURITY
IN THE PAST 12 MONTHS HAS THE ELECTRIC, GAS, OIL, OR WATER COMPANY THREATENED TO SHUT OFF SERVICES IN YOUR HOME?: PATIENT UNABLE TO ANSWER

## 2025-07-20 SDOH — SOCIAL STABILITY: SOCIAL INSECURITY: DOES ANYONE TRY TO KEEP YOU FROM HAVING/CONTACTING OTHER FRIENDS OR DOING THINGS OUTSIDE YOUR HOME?: UNABLE TO ASSESS

## 2025-07-20 SDOH — SOCIAL STABILITY: SOCIAL INSECURITY: HAS ANYONE EVER THREATENED TO HURT YOUR FAMILY OR YOUR PETS?: UNABLE TO ASSESS

## 2025-07-20 SDOH — ECONOMIC STABILITY: FOOD INSECURITY
WITHIN THE PAST 12 MONTHS, YOU WORRIED THAT YOUR FOOD WOULD RUN OUT BEFORE YOU GOT THE MONEY TO BUY MORE.: PATIENT UNABLE TO ANSWER

## 2025-07-20 SDOH — SOCIAL STABILITY: SOCIAL INSECURITY: DO YOU FEEL UNSAFE GOING BACK TO THE PLACE WHERE YOU ARE LIVING?: UNABLE TO ASSESS

## 2025-07-20 SDOH — SOCIAL STABILITY: SOCIAL INSECURITY: DO YOU FEEL ANYONE HAS EXPLOITED OR TAKEN ADVANTAGE OF YOU FINANCIALLY OR OF YOUR PERSONAL PROPERTY?: UNABLE TO ASSESS

## 2025-07-20 SDOH — ECONOMIC STABILITY: HOUSING INSECURITY
IN THE LAST 12 MONTHS, WAS THERE A TIME WHEN YOU WERE NOT ABLE TO PAY THE MORTGAGE OR RENT ON TIME?: PATIENT UNABLE TO ANSWER

## 2025-07-20 SDOH — ECONOMIC STABILITY: FOOD INSECURITY
WITHIN THE PAST 12 MONTHS, THE FOOD YOU BOUGHT JUST DIDN'T LAST AND YOU DIDN'T HAVE MONEY TO GET MORE.: PATIENT UNABLE TO ANSWER

## 2025-07-20 SDOH — SOCIAL STABILITY: SOCIAL INSECURITY
WITHIN THE LAST YEAR, HAVE YOU BEEN KICKED, HIT, SLAPPED, OR OTHERWISE PHYSICALLY HURT BY YOUR PARTNER OR EX-PARTNER?: PATIENT UNABLE TO ANSWER

## 2025-07-20 SDOH — ECONOMIC STABILITY: HOUSING INSECURITY: AT ANY TIME IN THE PAST 12 MONTHS, WERE YOU HOMELESS OR LIVING IN A SHELTER (INCLUDING NOW)?: PATIENT UNABLE TO ANSWER

## 2025-07-20 SDOH — ECONOMIC STABILITY: FOOD INSECURITY
HOW HARD IS IT FOR YOU TO PAY FOR THE VERY BASICS LIKE FOOD, HOUSING, MEDICAL CARE, AND HEATING?: PATIENT UNABLE TO ANSWER

## 2025-07-20 SDOH — SOCIAL STABILITY: SOCIAL INSECURITY: HAVE YOU HAD ANY THOUGHTS OF HARMING ANYONE ELSE?: UNABLE TO ASSESS

## 2025-07-20 SDOH — SOCIAL STABILITY: SOCIAL INSECURITY: ABUSE: ADULT

## 2025-07-20 SDOH — ECONOMIC STABILITY: TRANSPORTATION INSECURITY
IN THE PAST 12 MONTHS, HAS LACK OF TRANSPORTATION KEPT YOU FROM MEDICAL APPOINTMENTS OR FROM GETTING MEDICATIONS?: PATIENT UNABLE TO ANSWER

## 2025-07-20 SDOH — SOCIAL STABILITY: SOCIAL INSECURITY: WERE YOU ABLE TO COMPLETE ALL THE BEHAVIORAL HEALTH SCREENINGS?: NO

## 2025-07-20 SDOH — SOCIAL STABILITY: SOCIAL INSECURITY: HAVE YOU HAD THOUGHTS OF HARMING ANYONE ELSE?: UNABLE TO ASSESS

## 2025-07-20 SDOH — SOCIAL STABILITY: SOCIAL INSECURITY: ARE THERE ANY APPARENT SIGNS OF INJURIES/BEHAVIORS THAT COULD BE RELATED TO ABUSE/NEGLECT?: UNABLE TO ASSESS

## 2025-07-20 SDOH — SOCIAL STABILITY: SOCIAL INSECURITY: ARE YOU OR HAVE YOU BEEN THREATENED OR ABUSED PHYSICALLY, EMOTIONALLY, OR SEXUALLY BY ANYONE?: UNABLE TO ASSESS

## 2025-07-20 ASSESSMENT — COGNITIVE AND FUNCTIONAL STATUS - GENERAL
MOBILITY SCORE: 24
MOBILITY SCORE: 24
DAILY ACTIVITIY SCORE: 24
DAILY ACTIVITIY SCORE: 24
PATIENT BASELINE BEDBOUND: NO

## 2025-07-20 ASSESSMENT — ACTIVITIES OF DAILY LIVING (ADL)
DRESSING YOURSELF: NEEDS ASSISTANCE
LACK_OF_TRANSPORTATION: PATIENT UNABLE TO ANSWER
ADEQUATE_TO_COMPLETE_ADL: YES
JUDGMENT_ADEQUATE_SAFELY_COMPLETE_DAILY_ACTIVITIES: NO
TOILETING: NEEDS ASSISTANCE
PATIENT'S MEMORY ADEQUATE TO SAFELY COMPLETE DAILY ACTIVITIES?: NO
LACK_OF_TRANSPORTATION: PATIENT UNABLE TO ANSWER
WALKS IN HOME: NEEDS ASSISTANCE
FEEDING YOURSELF: NEEDS ASSISTANCE
BATHING: NEEDS ASSISTANCE
LACK_OF_TRANSPORTATION: PATIENT UNABLE TO ANSWER
GROOMING: NEEDS ASSISTANCE
HEARING - RIGHT EAR: FUNCTIONAL
HEARING - LEFT EAR: FUNCTIONAL

## 2025-07-20 ASSESSMENT — PATIENT HEALTH QUESTIONNAIRE - PHQ9
1. LITTLE INTEREST OR PLEASURE IN DOING THINGS: NOT AT ALL
2. FEELING DOWN, DEPRESSED OR HOPELESS: NOT AT ALL
SUM OF ALL RESPONSES TO PHQ9 QUESTIONS 1 & 2: 0

## 2025-07-20 ASSESSMENT — PAIN SCALES - GENERAL
PAINLEVEL_OUTOF10: 0 - NO PAIN
PAINLEVEL_OUTOF10: 6

## 2025-07-20 ASSESSMENT — PAIN DESCRIPTION - PAIN TYPE: TYPE: ACUTE PAIN

## 2025-07-20 ASSESSMENT — LIFESTYLE VARIABLES
SKIP TO QUESTIONS 9-10: 0
HOW OFTEN DO YOU HAVE A DRINK CONTAINING ALCOHOL: PATIENT UNABLE TO ANSWER
HOW MANY STANDARD DRINKS CONTAINING ALCOHOL DO YOU HAVE ON A TYPICAL DAY: PATIENT UNABLE TO ANSWER
AUDIT-C TOTAL SCORE: -1
AUDIT-C TOTAL SCORE: -1
HOW OFTEN DO YOU HAVE 6 OR MORE DRINKS ON ONE OCCASION: PATIENT UNABLE TO ANSWER

## 2025-07-20 ASSESSMENT — PAIN DESCRIPTION - ORIENTATION: ORIENTATION: LEFT

## 2025-07-20 ASSESSMENT — PAIN DESCRIPTION - LOCATION: LOCATION: ELBOW

## 2025-07-20 ASSESSMENT — PAIN - FUNCTIONAL ASSESSMENT: PAIN_FUNCTIONAL_ASSESSMENT: 0-10

## 2025-07-20 NOTE — ASSESSMENT & PLAN NOTE
-Monitor electrolytes, maintain potassium above 4 and magnesium above 2  -Will hold patient's home dose Xarelto until patient seen by orthopedic team, if decision is made to hold off on joint aspiration or no concern for bleeding risk, please resume

## 2025-07-20 NOTE — ED PROVIDER NOTES
EMERGENCY DEPARTMENT ENCOUNTER      Pt Name: Dash García  MRN: [id number]  Birthdate 1935  Date of evaluation: 7/20/2025  Provider: Manuela Lamas MD    CHIEF COMPLAINT       Chief Complaint   Patient presents with    Joint Swelling         HISTORY OF PRESENT ILLNESS    An 89-year-old male past medical history of A-fib (on Xarelto), HTN, RBBB, GERD, TIIDM presents with generalized weakness and new swelling of the left elbow.  Per his wife the patient was unable to stand from seated position at home today, which is a change from baseline.  Swelling and redness of the left elbow were also noted today.  The patient denies any trauma, falls, pain, fever, chills, nausea, vomiting, chest pain, shortness of breath or other systemic symptoms.      History provided by:  Relative, patient and medical records   used: No        Nursing Notes were reviewed.    PAST MEDICAL HISTORY   Medical History[1]      SURGICAL HISTORY     Surgical History[2]      CURRENT MEDICATIONS       Previous Medications    AMLODIPINE (NORVASC) 5 MG TABLET    TAKE ONE TABLET BY MOUTH EVERY DAY    LISINOPRIL 40 MG TABLET    TAKE ONE TABLET BY MOUTH EVERY DAY    PANTOPRAZOLE (PROTONIX) 40 MG EC TABLET    TAKE ONE TABLET BY MOUTH EVERY DAY    RIVAROXABAN (XARELTO) 15 MG TABLET    Take 1 tablet (15 mg) by mouth once daily in the evening. Take with meals. Take with food.    SIMVASTATIN (ZOCOR) 40 MG TABLET    Take 1 tablet (40 mg) by mouth once daily at bedtime.    TROSPIUM (SANCTURA) 20 MG TABLET    Take by mouth.       ALLERGIES     Patient has no known allergies.    FAMILY HISTORY     Family History[3]       SOCIAL HISTORY     Social History[4]    SCREENINGS                        PHYSICAL EXAM    (up to 7 for level 4, 8 or more for level 5)     ED Triage Vitals [07/20/25 1352]   Temperature Heart Rate Respirations BP   37 °C (98.6 °F) 75 16 (!) 148/91      Pulse Ox Temp Source Heart Rate Source Patient Position   96  % Temporal Monitor Sitting      BP Location FiO2 (%)     Right arm --       Physical Exam  Constitutional:       Appearance: Normal appearance.   HENT:      Head: Normocephalic and atraumatic.      Mouth/Throat:      Mouth: Mucous membranes are moist.      Pharynx: No oropharyngeal exudate or posterior oropharyngeal erythema.     Eyes:      Comments: Chronic ptosis of the right eye     Cardiovascular:      Rate and Rhythm: Normal rate and regular rhythm.      Pulses: Normal pulses.   Pulmonary:      Effort: Pulmonary effort is normal. No respiratory distress.      Breath sounds: Normal breath sounds. No stridor. No wheezing, rhonchi or rales.     Musculoskeletal:         General: Swelling present. No tenderness or deformity. Normal range of motion.      Cervical back: Normal range of motion.      Right lower leg: No edema.      Left lower leg: No edema.      Comments: There is some nondemarcated erythema and swelling in the olecranon area of the left elbow.  Soft compartments.     Skin:     General: Skin is warm and dry.      Capillary Refill: Capillary refill takes less than 2 seconds.      Findings: Erythema present. No bruising.     Neurological:      General: No focal deficit present.      Mental Status: He is alert. Mental status is at baseline.      Cranial Nerves: No cranial nerve deficit.      Sensory: No sensory deficit.      Motor: No weakness.     Psychiatric:         Mood and Affect: Mood normal.         Behavior: Behavior normal.          DIAGNOSTIC RESULTS     LABS:  Labs Reviewed   CBC WITH AUTO DIFFERENTIAL - Abnormal       Result Value    WBC 10.1      nRBC 0.0      RBC 5.21      Hemoglobin 14.4      Hematocrit 44.3      MCV 85      MCH 27.6      MCHC 32.5      RDW 15.8 (*)     Platelets 210      Neutrophils % 81.6      Immature Granulocytes %, Automated 0.3      Lymphocytes % 8.3      Monocytes % 9.3      Eosinophils % 0.1      Basophils % 0.4      Neutrophils Absolute 8.21 (*)     Immature  Granulocytes Absolute, Automated 0.03      Lymphocytes Absolute 0.83      Monocytes Absolute 0.93 (*)     Eosinophils Absolute 0.01      Basophils Absolute 0.04     COMPREHENSIVE METABOLIC PANEL - Abnormal    Glucose 128 (*)     Sodium 137      Potassium 3.9      Chloride 102      Bicarbonate 23      Anion Gap 16      Urea Nitrogen 21      Creatinine 1.65 (*)     eGFR 40 (*)     Calcium 10.2      Albumin 4.2      Alkaline Phosphatase 82      Total Protein 8.0      AST 13      Bilirubin, Total 1.7 (*)     ALT 7 (*)    B-TYPE NATRIURETIC PEPTIDE - Abnormal     (*)     Narrative:        <100 pg/mL - Heart failure unlikely  100-299 pg/mL - Intermediate probability of acute heart                  failure exacerbation. Correlate with clinical                  context and patient history.    >=300 pg/mL - Heart Failure likely. Correlate with clinical                  context and patient history.    BNP testing is performed using different testing methodology at [facility] than at other [facility]. Direct result comparisons should only be made within the same method.      PROTIME-INR - Abnormal    Protime 12.8 (*)     INR 1.2 (*)    SERIAL TROPONIN-INITIAL - Normal    Troponin I, High Sensitivity 7      Narrative:     Less than 99th percentile of normal range cutoff-  Female and children under 18 years old <14 ng/L; Male <21 ng/L: Negative  Repeat testing should be performed if clinically indicated.     Female and children under 18 years old 14-50 ng/L; Male 21-50 ng/L:  Consistent with possible cardiac damage and possible increased clinical   risk. Serial measurements may help to assess extent of myocardial damage.     >50 ng/L: Consistent with cardiac damage, increased clinical risk and  myocardial infarction. Serial measurements may help assess extent of   myocardial damage.      NOTE: Children less than 1 year old may have higher baseline troponin   levels and results should be interpreted in  conjunction with the overall   clinical context.     NOTE: Troponin I testing is performed using a different   testing methodology at [facility] than at other   [facility]. Direct result comparisons should only   be made within the same method.   SERIAL TROPONIN, 1 HOUR - Normal    Troponin I, High Sensitivity 6      Narrative:     Less than 99th percentile of normal range cutoff-  Female and children under 18 years old <14 ng/L; Male <21 ng/L: Negative  Repeat testing should be performed if clinically indicated.     Female and children under 18 years old 14-50 ng/L; Male 21-50 ng/L:  Consistent with possible cardiac damage and possible increased clinical   risk. Serial measurements may help to assess extent of myocardial damage.     >50 ng/L: Consistent with cardiac damage, increased clinical risk and  myocardial infarction. Serial measurements may help assess extent of   myocardial damage.      NOTE: Children less than 1 year old may have higher baseline troponin   levels and results should be interpreted in conjunction with the overall   clinical context.     NOTE: Troponin I testing is performed using a different   testing methodology at [facility] than at other   [facility]. Direct result comparisons should only   be made within the same method.   BLOOD CULTURE   BLOOD CULTURE   TROPONIN SERIES- (INITIAL, 1 HR)    Narrative:     The following orders were created for panel order Troponin I Series, High Sensitivity (0, 1 HR).  Procedure                               Abnormality         Status                     ---------                               -----------         ------                     Troponin I, High Sensiti...[[id number]]  Normal              Final result               Troponin, High Sensitivi...[[id number]]  Normal              Final result                 Please view results for these tests on the individual orders.   MAGNESIUM       All other labs were  within normal range or not returned as of this dictation.    Imaging  XR elbow left 1-2 views   Final Result   Suspected left elbow joint effusion. No osseous abnormality seen.        Signed by: Cem Vazquez 7/20/2025 3:12 PM   Dictation workstation:   [id number]      XR chest 1 view   Final Result   No evidence of acute intrathoracic abnormality.        Signed by: Cem Vazquez 7/20/2025 2:51 PM   Dictation workstation:   [id number]           Procedures  Procedures     EMERGENCY DEPARTMENT COURSE/MDM:     Diagnoses as of 07/20/25 1738   Pyogenic arthritis of left elbow, due to unspecified organism (Multi)   Bigeminy        Medical Decision Making  A 89-year-old male with complex medical history as noted above presents with generalized weakness and left elbow swelling.  The patient presented hemodynamically stable and afebrile.  No acute neurological deficits noted.  There is erythema and swelling in the olecranon aspect of the left elbow.  Neurovascular is intact.  Full ROM and no tenderness.  Clinical scenario most consistent with effusion bursa vs. septic joint.  On arrival, nursing staff noted  changes on cardiac monitor prompting an  EKG, which revealed new bigeminy.  Repeat EKG showed resolving bigeminy.. Although the patient remains asymptomatic a cardiac workup was ordered basic labs and troponin to evaluate for potential cardiac etiology. A left elbow x-ray was ordered to assess for traumatic changes in the setting of swelling and erythema.  Given concern for possible infection, the patient started on empiric IV antibiotics. Joint aspiration is not appropriate at this time due to bleeding given history of A-fib and current use of Xarelto.  Serial trops within normal parameters, CXR normal and workup remarkable for known kidney dysfunction and no leukocytosis in the setting of no symptoms.  The patient was admitted under medicine for further management.    Patient and or family in agreement and  understanding of treatment plan.  All questions answered.      I reviewed the case with the attending ED physician. The attending ED physician agrees with the plan. Patient and/or patient´s representative was counseled regarding labs, imaging, likely diagnosis, and plan. All questions were answered.    ED Medications administered this visit:    Medications   piperacillin-tazobactam (Zosyn) 4.5 g in dextrose (iso)  mL (0 g intravenous Stopped 7/20/25 1603)   vancomycin 1.5 g in NS  mL (1.5 g intravenous New Bag 7/20/25 1605)       New Prescriptions from this visit:    New Prescriptions    No medications on file       Follow-up:  No follow-up provider specified.      Final Impression:   1. Pyogenic arthritis of left elbow, due to unspecified organism (Multi)    2. Bigeminy          (Please note that portions of this note were completed with a voice recognition program.  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    Personal history of malignant neoplasm of prostate     History of malignant neoplasm of prostate    Personal history of other diseases of the circulatory system 07/30/2020    History of abnormal electrocardiography    Personal history of other diseases of the circulatory system 07/30/2020    History of cardiac murmur    Personal history of other diseases of the circulatory system 07/30/2020    History of essential hypertension    Personal history of other diseases of the digestive system     History of gastrointestinal hemorrhage    Personal history of other endocrine, nutritional and metabolic disease     History of diabetes mellitus    Transient cerebral ischemic attack, unspecified 07/30/2020    TIA  (transient ischemic attack)   [2]   Past Surgical History:  Procedure Laterality Date    OTHER SURGICAL HISTORY  07/30/2020    Cataract surgery   [3]   Family History  Problem Relation Name Age of Onset    Diabetes Brother          due to underlying condition with hyperglycemia, unspecified whether long term insulin use.    Hypertension Brother          Benign   [4]   Social History  Socioeconomic History    Marital status:    Tobacco Use    Smoking status: Never    Smokeless tobacco: Never   Substance and Sexual Activity    Alcohol use: Never    Drug use: Never        Manuela Lamas MD  Resident  07/20/25 8324

## 2025-07-20 NOTE — CONSULTS
Vancomycin Dosing by Pharmacy- INITIAL    Dash García is a 89 y.o. year old male who Pharmacy has been consulted for vancomycin dosing for osteomyelitis/septic arthritis. Based on the patient's indication and renal status this patient will be dosed based on a goal AUC of 400-600.     Renal function is currently stable.    Visit Vitals  /81 (BP Location: Right arm, Patient Position: Lying)   Pulse 65   Temp 37 °C (98.6 °F) (Temporal)   Resp 16        Lab Results   Component Value Date    CREATININE 1.65 (H) 2025    CREATININE 1.88 (H) 2022    CREATININE 1.84 (H) 2022    CREATININE 1.70 (H) 2022    CREATININE 1.66 (H) 2022        Patient weight is as follows:   Vitals:    25 1352   Weight: 72.6 kg (160 lb)       Cultures:  No results found for the encounter in last 14 days.        No intake/output data recorded.  I/O during current shift:  I/O this shift:  In: 600 [IV Piggyback:600]  Out: -     Temp (24hrs), Av °C (98.6 °F), Min:37 °C (98.6 °F), Max:37 °C (98.6 °F)         Assessment/Plan     Patient has already been given a loading dose of 1500 mg.  Will initiate vancomycin maintenance, 750 mg every 24 hours.    This dosing regimen is predicted by [employer] to result in the following pharmacokinetic parameters:  Loading dose: 1500mg  Regimen: 750 mg IV every 24 hours.  Start time: 16:00 on 2025  Exposure target: AUC24 (range) 400-600 mg/L.hr   NYE34-49: 423 mg/L.hr  AUC24,ss: 480 mg/L.hr  Probability of AUC24 > 400: 69 %  Ctrough,ss: 16 mg/L  Probability of Ctrough,ss > 20: 31 %    Follow-up level will be ordered on  at 0500 unless clinically indicated sooner.  Will continue to monitor renal function daily while on vancomycin and order serum creatinine at least every 48 hours if not already ordered.  Follow for continued vancomycin needs, clinical response, and signs/symptoms of toxicity.       Kb Sanders, PharmD

## 2025-07-20 NOTE — NURSING NOTE
1630: Pt arrived to room 3023 at this time. A&O to self. Pt denies any pain or discomfort. VSS. Safety maintained. Call light and fluids within reach.

## 2025-07-20 NOTE — ASSESSMENT & PLAN NOTE
-Per patient's description, onset few days ago gradually worsening  -Joint aspiration not done in the emergency room due to concern for bleeding  -I personally reviewed x-ray, showing possible left elbow effusion  -Patient was started on Vanco Zosyn in the emergency room for broad-spectrum coverage, will continue  -Orthopedic team consulted for evaluation to help determine if joint aspiration is needed  -ID consulted for cellulitis/joint infection

## 2025-07-20 NOTE — H&P
History Of Present Illness  Dash aGrcía is a 89 y.o. male presenting with generalized weakness and a left elbow swelling.  Patient has some underlying dementia and is poor historian, when asked why he is here in the hospital, patient states he does not know.  I have personally attempted to call patient's spouse to obtain more history, no one picked up the phone.  Most of the history obtained from ED report.  Per report, patient lives at home with spouse, he was brought in today when he had difficulty getting up.  Patient does have erythema and swelling at the left elbow on my examination, when asked, patient states this developed a few days ago and has been gradually worsening.  He has no complaint about the elbow except for when the arm is extended he feels some pain at the left elbow joint.  Patient did not think he had any fever at home, and he does not know why he is in the hospital.    While in the emergency room, he was afebrile in the emergency room.  Vitals otherwise stable.  BMP showed a creatinine of 1.65 which is chronic.   without any signs of fluid overload.  Troponin x 2 within normal limits.  CBC did not show any leukocytosis.  Chest x-ray no acute findings.  X-ray of the left elbow was done showing suspected left elbow joint effusion.  Blood culture was taken in the emergency room.  I have discussed personally with emergency room team, joint aspiration was not done due to elbow location and the fact that patient is on blood thinners.  Patient was given vancomycin and Zosyn and will be admitted to the hospital for further evaluation.    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    Surgical History  He has a past surgical history that includes Other surgical history (07/30/2020).     Social History  He reports that he has never smoked. He has never used smokeless tobacco. He reports that he does not drink alcohol and does not use drugs.    Family History  Family History[1]     Allergies  Patient has no known allergies.    Review of Systems   Somewhat limited due to patient's underlying mentation     Physical Exam by System:     Constitutional: Well developed, awake/alert/oriented x2, no distress, alert and cooperative   ENMT: mucous membranes moist   Head/Neck: Neck supple   Respiratory/Thorax: Patent airways, CTAB, normal breath sounds    Cardiovascular: Regular, rate and rhythm, no murmurs   Gastrointestinal: Nondistended, soft, non-tender   Musculoskeletal: Range of motion limited in the left upper extremity with extension of the elbow due to pain.  Erythema and swelling noted at the left elbow, however nontender on palpation   Extremities: No edema except as described above for left elbow   Neurological: alert and oriented x2, intact senses, motor. 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 rate 16, height 1.829 m (6'), weight 72.6 kg (160 lb), SpO2 96%.    Relevant Results  Imaging  XR elbow left 1-2 views  Result Date: 7/20/2025  Suspected left elbow joint effusion. No osseous abnormality seen.   Signed by: Cem Vazquez 7/20/2025 3:12 PM Dictation  workstation:   [id number]    XR chest 1 view  Result Date: 7/20/2025  No evidence of acute intrathoracic abnormality.   Signed by: Cem Vazquez 7/20/2025 2:51 PM Dictation workstation:   [id number]      Cardiology, Vascular, and Other Imaging  No other imaging results found for the past 7 days     Results for orders placed or performed during the hospital encounter of 07/20/25 (from the past 24 hours)   CBC with Differential   Result Value Ref Range    WBC 10.1 4.4 - 11.3 x10*3/uL    nRBC 0.0 0.0 - 0.0 /100 WBCs    RBC 5.21 4.50 - 5.90 x10*6/uL    Hemoglobin 14.4 13.5 - 17.5 g/dL    Hematocrit 44.3 41.0 - 52.0 %    MCV 85 80 - 100 fL    MCH 27.6 26.0 - 34.0 pg    MCHC 32.5 32.0 - 36.0 g/dL    RDW 15.8 (H) 11.5 - 14.5 %    Platelets 210 150 - 450 x10*3/uL    Neutrophils % 81.6 40.0 - 80.0 %    Immature Granulocytes %, Automated 0.3 0.0 - 0.9 %    Lymphocytes % 8.3 13.0 - 44.0 %    Monocytes % 9.3 2.0 - 10.0 %    Eosinophils % 0.1 0.0 - 6.0 %    Basophils % 0.4 0.0 - 2.0 %    Neutrophils Absolute 8.21 (H) 1.60 - 5.50 x10*3/uL    Immature Granulocytes Absolute, Automated 0.03 0.00 - 0.50 x10*3/uL    Lymphocytes Absolute 0.83 0.80 - 3.00 x10*3/uL    Monocytes Absolute 0.93 (H) 0.05 - 0.80 x10*3/uL    Eosinophils Absolute 0.01 0.00 - 0.40 x10*3/uL    Basophils Absolute 0.04 0.00 - 0.10 x10*3/uL   Comprehensive Metabolic Panel   Result Value Ref Range    Glucose 128 (H) 74 - 99 mg/dL    Sodium 137 136 - 145 mmol/L    Potassium 3.9 3.5 - 5.3 mmol/L    Chloride 102 98 - 107 mmol/L    Bicarbonate 23 21 - 32 mmol/L    Anion Gap 16 10 - 20 mmol/L    Urea Nitrogen 21 6 - 23 mg/dL    Creatinine 1.65 (H) 0.50 - 1.30 mg/dL    eGFR 40 (L) >60 mL/min/1.73m*2    Calcium 10.2 8.6 - 10.3 mg/dL    Albumin 4.2 3.4 - 5.0 g/dL    Alkaline Phosphatase 82 33 - 136 U/L    Total Protein 8.0 6.4 - 8.2 g/dL    AST 13 9 - 39 U/L    Bilirubin, Total 1.7 (H) 0.0 - 1.2 mg/dL    ALT 7 (L) 10 - 52 U/L   Brain Natriuretic Peptide   Result Value Ref  Range     (H) 0 - 99 pg/mL   Protime-INR   Result Value Ref Range    Protime 12.8 (H) 9.8 - 12.4 seconds    INR 1.2 (H) 0.9 - 1.1   Troponin I, High Sensitivity, Initial   Result Value Ref Range    Troponin I, High Sensitivity 7 0 - 20 ng/L   Troponin, High Sensitivity, 1 Hour   Result Value Ref Range    Troponin I, High Sensitivity 6 0 - 20 ng/L        Scheduled medications  Scheduled Medications[2]  Continuous medications  Continuous Medications[3]  PRN medications  PRN Medications[4]          Assessment/Plan   Assessment & Plan  Cellulitis of left elbow  Joint effusion of elbow, left  -Per patient's description, onset few days ago gradually worsening  -Joint aspiration not done in the emergency room due to concern for bleeding  -I personally reviewed x-ray, showing possible left elbow effusion  -Patient was started on Vanco Zosyn in the emergency room for broad-spectrum coverage, will continue  -Orthopedic team consulted for evaluation to help determine if joint aspiration is needed  -ID consulted for cellulitis/joint infection  Benign essential hypertension  -Continue amlodipine and lisinopril  Paroxysmal A-fib (Multi)  -Monitor electrolytes, maintain potassium above 4 and magnesium above 2  -Will hold patient's home dose Xarelto until patient seen by orthopedic team, if decision is made to hold off on joint aspiration or no concern for bleeding risk, please resume  Dyslipidemia  -Continue statin    DVT prophylaxis: SCD, resume Xarelto once cleared by orthopedic team  Assume full code until clarified with spouse         Complexity: Complex    This dictation was created using voice recognition software.  If there are any questions about inaccuracies please do not hesitate to contact me.      SIGNATURE: Aden Rosenberg MD PATIENT NAME: Dash García   DATE: July 20, 2025 MRN: [id number]   TIME: 5:44 PM             [1]   Family History  Problem Relation Name Age of Onset    Diabetes Brother          due to  underlying condition with hyperglycemia, unspecified whether long term insulin use.    Hypertension Brother          Benign   [2] [3] [4]

## 2025-07-21 LAB
ANION GAP SERPL CALC-SCNC: 11 MMOL/L (ref 10–20)
BUN SERPL-MCNC: 22 MG/DL (ref 6–23)
CALCIUM SERPL-MCNC: 9.6 MG/DL (ref 8.6–10.3)
CHLORIDE SERPL-SCNC: 104 MMOL/L (ref 98–107)
CO2 SERPL-SCNC: 27 MMOL/L (ref 21–32)
CREAT SERPL-MCNC: 1.64 MG/DL (ref 0.5–1.3)
CRP SERPL-MCNC: 18.17 MG/DL
EGFRCR SERPLBLD CKD-EPI 2021: 40 ML/MIN/1.73M*2
ERYTHROCYTE [DISTWIDTH] IN BLOOD BY AUTOMATED COUNT: 15.5 % (ref 11.5–14.5)
ERYTHROCYTE [SEDIMENTATION RATE] IN BLOOD BY WESTERGREN METHOD: 41 MM/H (ref 0–20)
GLUCOSE SERPL-MCNC: 119 MG/DL (ref 74–99)
HCT VFR BLD AUTO: 42.6 % (ref 41–52)
HGB BLD-MCNC: 13.6 G/DL (ref 13.5–17.5)
MAGNESIUM SERPL-MCNC: 1.62 MG/DL (ref 1.6–2.4)
MCH RBC QN AUTO: 27.4 PG (ref 26–34)
MCHC RBC AUTO-ENTMCNC: 31.9 G/DL (ref 32–36)
MCV RBC AUTO: 86 FL (ref 80–100)
NRBC BLD-RTO: 0 /100 WBCS (ref 0–0)
PLATELET # BLD AUTO: 194 X10*3/UL (ref 150–450)
POTASSIUM SERPL-SCNC: 3.8 MMOL/L (ref 3.5–5.3)
Q ONSET: 223 MS
Q ONSET: 224 MS
QRS COUNT: 10 BEATS
QRS COUNT: 11 BEATS
QRS DURATION: 152 MS
QRS DURATION: 154 MS
QT INTERVAL: 420 MS
QT INTERVAL: 436 MS
QTC CALCULATION(BAZETT): 438 MS
QTC CALCULATION(BAZETT): 459 MS
QTC FREDERICIA: 438 MS
QTC FREDERICIA: 446 MS
R AXIS: 120 DEGREES
R AXIS: 147 DEGREES
RBC # BLD AUTO: 4.97 X10*6/UL (ref 4.5–5.9)
SODIUM SERPL-SCNC: 138 MMOL/L (ref 136–145)
T AXIS: 56 DEGREES
T AXIS: 62 DEGREES
T OFFSET: 434 MS
T OFFSET: 441 MS
URATE SERPL-MCNC: 6.3 MG/DL (ref 4–7.5)
VENTRICULAR RATE: 61 BPM
VENTRICULAR RATE: 72 BPM
WBC # BLD AUTO: 9 X10*3/UL (ref 4.4–11.3)

## 2025-07-21 PROCEDURE — 99233 SBSQ HOSP IP/OBS HIGH 50: CPT

## 2025-07-21 PROCEDURE — 84550 ASSAY OF BLOOD/URIC ACID: CPT

## 2025-07-21 PROCEDURE — 80048 BASIC METABOLIC PNL TOTAL CA: CPT | Performed by: STUDENT IN AN ORGANIZED HEALTH CARE EDUCATION/TRAINING PROGRAM

## 2025-07-21 PROCEDURE — 86431 RHEUMATOID FACTOR QUANT: CPT | Mod: STJLAB

## 2025-07-21 PROCEDURE — 85652 RBC SED RATE AUTOMATED: CPT

## 2025-07-21 PROCEDURE — 2500000004 HC RX 250 GENERAL PHARMACY W/ HCPCS (ALT 636 FOR OP/ED)

## 2025-07-21 PROCEDURE — 2500000001 HC RX 250 WO HCPCS SELF ADMINISTERED DRUGS (ALT 637 FOR MEDICARE OP)

## 2025-07-21 PROCEDURE — 86140 C-REACTIVE PROTEIN: CPT

## 2025-07-21 PROCEDURE — 2500000001 HC RX 250 WO HCPCS SELF ADMINISTERED DRUGS (ALT 637 FOR MEDICARE OP): Performed by: INTERNAL MEDICINE

## 2025-07-21 PROCEDURE — 2500000002 HC RX 250 W HCPCS SELF ADMINISTERED DRUGS (ALT 637 FOR MEDICARE OP, ALT 636 FOR OP/ED): Performed by: STUDENT IN AN ORGANIZED HEALTH CARE EDUCATION/TRAINING PROGRAM

## 2025-07-21 PROCEDURE — 97161 PT EVAL LOW COMPLEX 20 MIN: CPT | Mod: GP

## 2025-07-21 PROCEDURE — 2500000002 HC RX 250 W HCPCS SELF ADMINISTERED DRUGS (ALT 637 FOR MEDICARE OP, ALT 636 FOR OP/ED)

## 2025-07-21 PROCEDURE — 2500000004 HC RX 250 GENERAL PHARMACY W/ HCPCS (ALT 636 FOR OP/ED): Performed by: STUDENT IN AN ORGANIZED HEALTH CARE EDUCATION/TRAINING PROGRAM

## 2025-07-21 PROCEDURE — 85027 COMPLETE CBC AUTOMATED: CPT | Performed by: STUDENT IN AN ORGANIZED HEALTH CARE EDUCATION/TRAINING PROGRAM

## 2025-07-21 PROCEDURE — 83735 ASSAY OF MAGNESIUM: CPT | Performed by: STUDENT IN AN ORGANIZED HEALTH CARE EDUCATION/TRAINING PROGRAM

## 2025-07-21 PROCEDURE — 99221 1ST HOSP IP/OBS SF/LOW 40: CPT | Performed by: ORTHOPAEDIC SURGERY

## 2025-07-21 PROCEDURE — 97165 OT EVAL LOW COMPLEX 30 MIN: CPT | Mod: GO

## 2025-07-21 PROCEDURE — 20605 DRAIN/INJ JOINT/BURSA W/O US: CPT | Mod: 25 | Performed by: ORTHOPAEDIC SURGERY

## 2025-07-21 PROCEDURE — 36415 COLL VENOUS BLD VENIPUNCTURE: CPT | Performed by: STUDENT IN AN ORGANIZED HEALTH CARE EDUCATION/TRAINING PROGRAM

## 2025-07-21 PROCEDURE — 20605 DRAIN/INJ JOINT/BURSA W/O US: CPT | Performed by: ORTHOPAEDIC SURGERY

## 2025-07-21 PROCEDURE — 2500000001 HC RX 250 WO HCPCS SELF ADMINISTERED DRUGS (ALT 637 FOR MEDICARE OP): Performed by: STUDENT IN AN ORGANIZED HEALTH CARE EDUCATION/TRAINING PROGRAM

## 2025-07-21 PROCEDURE — 87075 CULTR BACTERIA EXCEPT BLOOD: CPT | Mod: STJLAB | Performed by: PHYSICIAN ASSISTANT

## 2025-07-21 PROCEDURE — 0R9M3ZX DRAINAGE OF LEFT ELBOW JOINT, PERCUTANEOUS APPROACH, DIAGNOSTIC: ICD-10-PCS | Performed by: ORTHOPAEDIC SURGERY

## 2025-07-21 PROCEDURE — 1200000002 HC GENERAL ROOM WITH TELEMETRY DAILY

## 2025-07-21 RX ORDER — ACETAMINOPHEN 500 MG
5 TABLET ORAL NIGHTLY PRN
Status: DISCONTINUED | OUTPATIENT
Start: 2025-07-21 | End: 2025-07-24 | Stop reason: HOSPADM

## 2025-07-21 RX ORDER — MAGNESIUM SULFATE HEPTAHYDRATE 40 MG/ML
2 INJECTION, SOLUTION INTRAVENOUS ONCE
Status: COMPLETED | OUTPATIENT
Start: 2025-07-21 | End: 2025-07-21

## 2025-07-21 RX ORDER — CEPHALEXIN 500 MG/1
500 CAPSULE ORAL EVERY 6 HOURS SCHEDULED
Status: CANCELLED | OUTPATIENT
Start: 2025-07-21

## 2025-07-21 RX ORDER — CEFTRIAXONE 1 G/50ML
1 INJECTION, SOLUTION INTRAVENOUS EVERY 24 HOURS
Status: DISCONTINUED | OUTPATIENT
Start: 2025-07-21 | End: 2025-07-24 | Stop reason: HOSPADM

## 2025-07-21 RX ORDER — POTASSIUM CHLORIDE 1.5 G/1.58G
20 POWDER, FOR SOLUTION ORAL ONCE
Status: COMPLETED | OUTPATIENT
Start: 2025-07-21 | End: 2025-07-21

## 2025-07-21 RX ADMIN — LISINOPRIL 40 MG: 40 TABLET ORAL at 10:21

## 2025-07-21 RX ADMIN — RIVAROXABAN 15 MG: 15 TABLET, FILM COATED ORAL at 16:46

## 2025-07-21 RX ADMIN — CEFTRIAXONE 1 G: 1 INJECTION, SOLUTION INTRAVENOUS at 16:45

## 2025-07-21 RX ADMIN — ACETAMINOPHEN 650 MG: 325 TABLET ORAL at 21:26

## 2025-07-21 RX ADMIN — MAGNESIUM SULFATE HEPTAHYDRATE 2 G: 40 INJECTION, SOLUTION INTRAVENOUS at 17:16

## 2025-07-21 RX ADMIN — AMLODIPINE BESYLATE 5 MG: 5 TABLET ORAL at 10:21

## 2025-07-21 RX ADMIN — PANTOPRAZOLE SODIUM 40 MG: 40 TABLET, DELAYED RELEASE ORAL at 20:49

## 2025-07-21 RX ADMIN — PIPERACILLIN SODIUM AND TAZOBACTAM SODIUM 3.38 G: 3; .375 INJECTION, SOLUTION INTRAVENOUS at 04:52

## 2025-07-21 RX ADMIN — Medication 5 MG: at 21:26

## 2025-07-21 RX ADMIN — POTASSIUM CHLORIDE 20 MEQ: 1.5 POWDER, FOR SOLUTION ORAL at 16:46

## 2025-07-21 RX ADMIN — SIMVASTATIN 40 MG: 40 TABLET, FILM COATED ORAL at 20:49

## 2025-07-21 ASSESSMENT — COGNITIVE AND FUNCTIONAL STATUS - GENERAL
TOILETING: A LITTLE
TURNING FROM BACK TO SIDE WHILE IN FLAT BAD: A LITTLE
PERSONAL GROOMING: A LITTLE
HELP NEEDED FOR BATHING: A LITTLE
MOVING FROM LYING ON BACK TO SITTING ON SIDE OF FLAT BED WITH BEDRAILS: A LITTLE
DAILY ACTIVITIY SCORE: 19
DAILY ACTIVITIY SCORE: 24
CLIMB 3 TO 5 STEPS WITH RAILING: TOTAL
STANDING UP FROM CHAIR USING ARMS: A LITTLE
WALKING IN HOSPITAL ROOM: A LITTLE
MOBILITY SCORE: 24
DRESSING REGULAR UPPER BODY CLOTHING: A LITTLE
DRESSING REGULAR LOWER BODY CLOTHING: A LITTLE
MOBILITY SCORE: 16
MOVING TO AND FROM BED TO CHAIR: A LITTLE

## 2025-07-21 ASSESSMENT — ACTIVITIES OF DAILY LIVING (ADL)
ADL_ASSISTANCE: INDEPENDENT
ADL_ASSISTANCE: INDEPENDENT
LACK_OF_TRANSPORTATION: NO

## 2025-07-21 ASSESSMENT — PAIN DESCRIPTION - DESCRIPTORS: DESCRIPTORS: ACHING

## 2025-07-21 ASSESSMENT — PAIN - FUNCTIONAL ASSESSMENT
PAIN_FUNCTIONAL_ASSESSMENT: 0-10

## 2025-07-21 ASSESSMENT — PAIN SCALES - GENERAL
PAINLEVEL_OUTOF10: 1
PAINLEVEL_OUTOF10: 0 - NO PAIN

## 2025-07-21 NOTE — PROGRESS NOTES
Physical Therapy    Physical Therapy Evaluation    Patient Name: Dash García  MRN: [id number]  Today's Date: 7/21/2025   Time Calculation  Start Time: 0910  Stop Time: 0923  Time Calculation (min): 13 min  3023/3023-A    Assessment/Plan   PT Assessment  PT Assessment Results: Decreased strength, Decreased range of motion, Decreased endurance, Impaired balance, Decreased mobility, Decreased safety awareness, Decreased cognition, Impaired judgement  Rehab Prognosis: Good  Medical Staff Made Aware: Yes  End of Session Communication: Bedside nurse  Assessment Comment: Pt is a 89 y/o male admitted for weakness, hypertension and L elbow joint swelling, now s/p bedside aspiration. Pt presents with baseline dementia, decreased strength, endurance and balance. Pt able to tolerate transfers and gait training. He is functioning below baseline level of function and will benefit from skilled therapy during stay to improve overall functional mobility and safety awareness. Upon discharge pt will benefit from low intensity therapy with 24 hr supervision 2/2 cognition for continued improvement in functional mobility.     End of Session Patient Position: Up in chair, Alarm on (call light withinr reach)  IP OR SWING BED PT PLAN  Inpatient or Swing Bed: Inpatient  PT Plan  Treatment/Interventions: Bed mobility, Transfer training, Gait training, Stair training, Balance training, Neuromuscular re-education, Endurance training, Strengthening, Range of motion, Therapeutic exercise, Therapeutic activity, Home exercise program, Positioning, Postural re-education  PT Plan: Ongoing PT  PT Frequency: 4 times per week  PT Discharge Recommendations: Low intensity level of continued care, 24 hr supervision due to cognition  Equipment Recommended upon Discharge: Wheeled walker  PT Recommended Transfer Status: Assist x1, Assistive device, Contact guard  PT - OK to Discharge: Yes (Once medically cleared)    Subjective     Current Problem:  1.  Pyogenic arthritis of left elbow, due to unspecified organism (Multi)  Referral to Home Care    Walker standard      2. [facility]          Problem List[1]    General Visit Information:  General  Reason for Referral: P/w with generalized weakness and new swelling of the left elbow.  Per his wife the patient was unable to stand from seated position at home today, which is a change from baseline.  Swelling and redness of the left elbow were also noted today. No trauma. X-ray of the left elbow was done showing suspected left elbow joint effusion. Pt now s/p bedside aspiration of L elbow by Dr. Owens 7/20.  Referred By: Dr. Lisa  Past Medical History Relevant to Rehab: A-fib (on Xarelto), HTN, RBBB, GERD, TIIDM, Dementia  Family/Caregiver Present: No  Co-Treatment: OT  Co-Treatment Reason: To maximize pt safety with functional mobility and discharge planning  Prior to Session Communication: Bedside nurse  Patient Position Received: Bed, 3 rail up, Alarm on (Pt seated EOB with RN present)  Preferred Learning Style: verbal  General Comment: Pt pleasant and agreeable to work with therapy.    Home Living:  Home Living  Home Living Comments: Pt questionable historian 2/2 dementia and confusion. Pt reports he lives with his wife in a house, no steps. First floor set up with WIS, no DME. Pt reports he is independent with mobility and ADLs.    Prior Level of Function:  Prior Function Per Pt/Caregiver Report  Level of [place]: Independent with ADLs and functional transfers, Needs assistance with homemaking  Receives Help From: Family  ADL Assistance: Independent  Homemaking Assistance: Needs assistance  Ambulatory Assistance: Independent  Prior Function Comments: (-) falls    Precautions:  Precautions  Medical Precautions: Fall precautions    Objective     Pain:  Pain Assessment  Pain Assessment: 0-10  0-10 (Numeric) Pain Score: 0 - No pain    Cognition:  Cognition  Overall Cognitive Status: Impaired at  baseline  Orientation Level: Disoriented to situation, Disoriented to time  Cognition Comments: Baseline dementia, pleasantly confused    General Assessments:      Activity Tolerance  Endurance: Tolerates 10 - 20 min exercise with multiple rests    Sensation  Light Touch: No apparent deficits  Sensation Comment: Pt denies any n/t.     Static Sitting Balance  Static Sitting-Balance Support: Bilateral upper extremity supported, Feet supported  Static Sitting-Level of Assistance: Close supervision, Distant supervision  Static Standing Balance  Static Standing-Balance Support: Bilateral upper extremity supported  Static Standing-Level of Assistance: Contact guard  Dynamic Standing Balance  Dynamic Standing-Balance Support: Bilateral upper extremity supported  Dynamic Standing-Level of Assistance: Contact guard  Dynamic Standing-Balance: Lateral lean, Forward lean, Reaching for objects, Reaching across midline, Turning    Functional Assessments:     Bed Mobility  Bed Mobility: No (Pt recieved sitting EOB and returned to chair post tx)    Transfers  Transfer: Yes  Transfer 1  Transfer From 1: Bed to  Transfer to 1: Stand  Technique 1: Sit to stand  Transfer Device 1: Gait belt  Transfer Level of Assistance 1: Minimum assistance, Moderate verbal cues  Trials/Comments 1: Pt slow to rise. VCs for proper hand placement and body mechanics.  Transfers 2  Transfer From 2: Stand to  Transfer to 2: Chair with arms  Technique 2: Stand to sit  Transfer Device 2: Walker, Gait belt  Transfer Level of Assistance 2: Contact guard, Moderate verbal cues  Trials/Comments 2: VCs for proper hand placement, body mechanics and positioning of FWW in relation to chair    Ambulation/Gait Training  Ambulation/Gait Training Performed: Yes  Ambulation/Gait Training 1  Surface 1: Level tile  Device 1: No device  Gait Support Devices: Gait belt  Assistance 1: Contact guard, Minimal verbal cues  Quality of Gait 1: Diminished heel strike, Wide base of  support, Forward flexed posture (decreased sang, insufficient foot clearance, instability)  Comments/Distance (ft) 1: ~10 ft with reciprocal gait pattern; demos instability with need to grab onto walls/furniture, pt encouraged to use FWW for better stability and safety  Ambulation/Gait Training 2  Surface 2: Level tile  Device 2: Rolling walker  Gait Support Devices: Gait belt  Assistance 2: Contact guard, Minimal verbal cues  Quality of Gait 2: Diminished heel strike, Forward flexed posture (decreased sang, insufficient foot clearance, demos better stability with walker)  Comments/Distance (ft) 2: ~40 ft with reciprocal gait pattern; VCs for safety awareness with FWW especially with directional turns in relation to IV cord     Extremity/Trunk Assessments:     RLE   RLE : Within Functional Limits  LLE   LLE : Within Functional Limits    Outcome Measures:     [facility] Basic Mobility  Turning from your back to your side while in a flat bed without using bedrails: A little  Moving from lying on your back to sitting on the side of a flat bed without using bedrails: A little  Moving to and from bed to chair (including a wheelchair): A little  Standing up from a chair using your arms (e.g. wheelchair or bedside chair): A little  To walk in hospital room: A little  Climbing 3-5 steps with railing: Total  Basic Mobility - Total Score: 16     Goals:  Encounter Problems       Encounter Problems (Active)       PT Problem       Pt will be able to complete all bed mobility tasks (rolling, sit <> sup) mod I.          Start:  07/21/25    Expected End:  08/04/25            Pt will be able to complete all transfers with LRD SBA demonstrating good safety awareness and proper body mechanics.        Start:  07/21/25    Expected End:  08/04/25            Pt will be able to ambulate >/= 100 ft with LRD SBA with good safety awareness and stability.        Start:  07/21/25    Expected End:  08/04/25            Pt will complete supine,  seated and standing exercises to maintain/improve overall strength with minimal verbal cues.         Start:  07/21/25    Expected End:  08/04/25                 Education Documentation  Precautions, taught by Mirlande Tristan PT at 7/21/2025 11:42 AM.  Learner: Patient  Readiness: Acceptance  Method: Explanation  Response: Verbalizes Understanding, Demonstrated Understanding, Needs Reinforcement    Body Mechanics, taught by Mirlande Tristan PT at 7/21/2025 11:42 AM.  Learner: Patient  Readiness: Acceptance  Method: Explanation  Response: Verbalizes Understanding, Demonstrated Understanding, Needs Reinforcement    Home Exercise Program, taught by Mirlande Tristan PT at 7/21/2025 11:42 AM.  Learner: Patient  Readiness: Acceptance  Method: Explanation  Response: Verbalizes Understanding, Demonstrated Understanding, Needs Reinforcement    Mobility Training, taught by Mirlande Tristan PT at 7/21/2025 11:42 AM.  Learner: Patient  Readiness: Acceptance  Method: Explanation  Response: Verbalizes Understanding, Demonstrated Understanding, Needs Reinforcement    Education Comments  No comments found.           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

## 2025-07-21 NOTE — PROGRESS NOTES
Occupational Therapy    Occupational Therapy    Evaluation    Patient Name: Dash García  MRN: [id number]  Today's Date: 7/21/2025  Time Calculation  Start Time: 0909  Stop Time: 0923  Time Calculation (min): 14 min  3023/3023-A    Assessment  IP OT Assessment  OT Assessment:  (Pt. presents with decreasd cogntion, balance and endurance impacting pt ability to complete ADLs and mobiliy independently)  Prognosis: Good  Barriers to Discharge Home: Cognition needs  Cognition Needs: 24hr supervision for safety awareness needed  Evaluation/Treatment Tolerance: Patient tolerated treatment well  End of Session Communication: Bedside nurse  End of Session Patient Position: Up in chair, Alarm on    Plan:  Treatment Interventions: ADL retraining, Functional transfer training, UE strengthening/ROM, Endurance training, Cognitive reorientation  OT Frequency: 4 times per week  OT Discharge Recommendations: Low intensity level of continued care  Equipment Recommended upon Discharge: Wheeled walker  OT Recommended Transfer Status:  (CGA)  OT - OK to Discharge: Yes (Next level of care when cleared by medical team)    Subjective Per EMR:   Dash García is a 89 y.o. male presenting with generalized weakness and a left elbow swelling.  Patient has some underlying dementia and is poor historian, when asked why he is here in the hospital, patient states he does not know.  I have personally attempted to call patient's spouse to obtain more history, no one picked up the phone.  Most of the history obtained from ED report.  Per report, patient lives at home with spouse, he was brought in today when he had difficulty getting up.  Patient does have erythema and swelling at the left elbow on my examination, when asked, patient states this developed a few days ago and has been gradually worsening.  He has no complaint about the elbow except for when the arm is extended he feels some pain at the left elbow joint.  Patient did not think he  had any fever at home, and he does not know why he is in the hospital.   Current Problem:  1. Pyogenic arthritis of left elbow, due to unspecified organism (Multi)  Referral to Home Care    Walker standard      2. Bigeminy            General:  General  Reason for Referral: ADLs, discharge planning  Referred By: Dr. Lisa  Past Medical History Relevant to Rehab: Dementia  Family/Caregiver Present: No  Co-Treatment: PT  Co-Treatment Reason: Safety  Prior to Session Communication: Bedside nurse  Patient Position Received: Bed, 3 rail up, Alarm on  Preferred Learning Style: verbal  General Comment: Pt pleasant and cooperative. Sitting EOB upon arrival with RN, needing to use restroom    Precautions:  Medical Precautions: Fall precautions        Pain:  Pain Assessment  Pain Assessment: 0-10  0-10 (Numeric) Pain Score: 0 - No pain    Objective     Cognition:  Orientation Level: Disoriented to time, Disoriented to situation  Sustained Attention: Impaired  Insight: Moderate  Impulsive: Mildly  Processing Speed: Delayed             Home Living:  Home Living Comments:  (Lives with spouse in home with first floor set up with walk in shower with grab bars. PLOF independent)     Prior Function:  Level of Dane: Independent with ADLs and functional transfers  Receives Help From: Family  ADL Assistance: Independent  Homemaking Assistance: Needs assistance  Ambulatory Assistance: Independent        ADL:  Grooming Assistance: Stand by  Grooming Deficit: Wash/dry hands, Standing with assistive device, Supervision/safety, Verbal cueing  Toileting Assistance with Device: Stand by  Toileting Deficit:  (standing at toilet with ww)    Activity Tolerance:  Endurance: Tolerates 10 - 20 min exercise with multiple rests    Bed Mobility/Transfers:   Bed Mobility  Bed Mobility:  (supine <> sit MOD I)  Transfers  Transfer:  (sit<>stand min assist with cues for safe hand placement; feet slidding when attempting to stand, able to self  correct)    Ambulation/Gait Training:  Functional Mobility  Functional Mobility Performed:  (Completes functional mobility with ww and gait belt CGA)    Sitting Balance:  Static Sitting Balance  Static Sitting-Balance Support: No upper extremity supported  Static Sitting-Level of Assistance: Independent    Standing Balance:  Static Standing Balance  Static Standing-Balance Support: Bilateral upper extremity supported  Static Standing-Level of Assistance: Contact guard      Sensation:  Sensation Comment: Denies numbness        Hand Function:  Hand Function  Gross Grasp: Functional  Coordination: Functional    Extremities: RUE   RUE : Within Functional Limits and LUE   LUE: Within Functional Limits    Outcome Measures: [facility] Daily Activity  Putting on and taking off regular lower body clothing: A little  Bathing (including washing, rinsing, drying): A little  Putting on and taking off regular upper body clothing: A little  Toileting, which includes using toilet, bedpan or urinal: A little  Taking care of personal grooming such as brushing teeth: A little  Eating Meals: None  Daily Activity - Total Score: 19                       EDUCATION:  Education  Individual(s) Educated: Patient  Education Provided: Fall precautons, Risk and benefits of OT discussed with patient or other  Patient Response to Education: Patient/Caregiver Verbalized Understanding of Information      Goals:   Encounter Problems       Encounter Problems (Active)       Dressings Lower Extremities       STG - Patient to complete lower body dressing MOD I       Start:  07/21/25    Expected End:  08/04/25               Functional Balance       LTG - Patient will maintain standing and sitting balance to allow for completion of daily activities       Start:  07/21/25    Expected End:  08/04/25               Grooming       STG - Patient completes grooming MOD I       Start:  07/21/25    Expected End:  08/04/25               OT Transfers       STG - Patient  will perform toilet transfer MOD I       Start:  07/21/25    Expected End:  08/04/25

## 2025-07-21 NOTE — NURSING NOTE
End of shift note. No acute changes this shift. Patient resting in bed at this time. Sitter maintained at bedside. Patient wife updated on plan of care earlier this shift.

## 2025-07-21 NOTE — CONSULTS
Chief complaint: Left elbow pain    History of present illness: Orthopedics was consulted for left elbow pain.  Patient was admitted for some generalized weakness.  He is a poor historian.  He has some dementia.  No fevers chills nausea vomiting.  No history of elbow surgery or trauma.    Past medical history:Medical History[1]    Social history:   Social History     Occupational History    Not on file   Tobacco Use    Smoking status: Never    Smokeless tobacco: Never   Substance and Sexual Activity    Alcohol use: Defer    Drug use: Never    Sexual activity: Not on file        Family history: Family History[2]     Physical exam: Left elbow has some thickening of the bursa and soft tissues throughout the elbow.  However, there appears to be a small effusion but hard to say for sure with the overlying swelling.  There is minimal warmth.  There is no significant redness.  Flexion extension and pronation supination does give the patient pain at extremes and he does have decreased range of motion.  Shoulder and wrist do not have any redness warmth swelling or puffiness.  There is no lymphangitis or lymphadenopathy.  HEENT clear, heart regular, abdomin soft, chest clear.    Imaging/special tests: X-rays show possible joint effusion but no significant abnormalities of the left elbow    Assessment: Patient severely inhibited with bodily function as he is admitted to hospital with some weakness.  Looks like there is at least cellulitis and bursitis and possible joint involvement.    Under sterile conditions after the elbow was prepped with ChloraPrep aspiration attempt was performed laterally in the elbow joint.  The needle was in the joint but very minimal joint fluid was obtained.  There may be enough to get a culture.  There is not enough for Gram stain or crystals.  Unclear if this is gout or infection.    Plan: We will wait and see how the patient responds to IV antibiotics.  We will see how the elbow evolves over  the next 24 hours.  We will send that aspiration for cultures.  Emergency room notes and internal medicine notes were reviewed from yesterday where most of the history was obtained.    Thank you very much for this consultation.  Please do not hesitate to contact me with any questions or concerns.    Kannan Owens M.D.      This dictation was created using voice recognition software.  If there are any questions about inaccuracies please do not hesitate to contact me.       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    Personal history of malignant neoplasm of prostate     History of malignant neoplasm of prostate    Personal history of other diseases of the circulatory system 07/30/2020    History of abnormal electrocardiography    Personal history of other diseases of the circulatory system 07/30/2020    History of cardiac murmur    Personal history of other diseases of the circulatory system 07/30/2020    History of essential hypertension    Personal history of other diseases of the digestive system     History of gastrointestinal hemorrhage    Personal history of other endocrine, nutritional and metabolic disease     History of diabetes mellitus    Transient cerebral ischemic attack, unspecified 07/30/2020    TIA (transient ischemic attack)   [2]   Family History  Problem Relation Name Age of Onset    Diabetes Brother          due to underlying condition with hyperglycemia, unspecified whether long term insulin use.    Hypertension Brother          Benign

## 2025-07-21 NOTE — NURSING NOTE
Patient frequently attempting to stand up from chair or bed and trying to get dressed, patient alarm maintained and frequently reoriented. Patient thinks wife is coming to take home. Patient encouraged to stay in bed and call for assistance. Sitter at bedside for safety, patient confused and alert to person only. Patient had gotten up from chair this shift and lost his balance and staff member was able to assistance patient back to chair and avoid falling.  Attempted to reorient patient multiple times.

## 2025-07-21 NOTE — CONSULTS
" Infectious Disease Consult    PATIENT NAME: Dash García    MRN: [id number]  SERVICE DATE:  7/21/2025   SERVICE TIME:  10:22 AM    PRIMARY CARE PHYSICIAN: Andrew Baker DO  REASON FOR CONSULT: \"left elbow effusion/cellulitis\"  REQUESTING PHYSICIAN: Dr. Aden Rosenberg    HPI  Patient is an 89-year-old male who presented to [facility] ED on 7/20 with generalized weakness and left elbow swelling. PMHx significant for dementia, HTN, paroxysmal atrial fibrillation, DLD.  Swelling and erythema about the elbow began a few days prior to admission, and has been getting progressively worse. No pain except on full extension. Denies any fevers at home. Presented for inability to get up on his own at home.    In the emergency room, patient was afebrile and hemodynamically stable. No leukocytosis noted. XR left elbow showed left joint effusion. Blood cultures collected. Joint aspiration not done due to the fact that the patient is on blood thinners. Started on vancomycin and zosyn, which was continued upon admission. Orthopedic surgery (Dr. Kannan Owens) evaluated the patient today and performed a joint aspiration at bedside, with minimal aspirate obtained. Fluid sent for culture analysis, but not enough for gram stain or crystals per orthopedics.     This morning patient denied any acute complaints. No fevers overnight. Reported continued swelling and redness about the elbow, but no significant pain. No prior injury or procedure on the elbow.     PAST MEDICAL HISTORY: Medical History[1]  PAST SURGICAL HISTORY: Surgical History[2]  FAMILY HISTORY: Family History[3]  SOCIAL HISTORY: Social History[4]  CURRENT ALLERGIES:   Allergies as of 07/20/2025    (No Known Allergies)     MEDICATIONS:  Current Medications[5]       COMPLETE REVIEW OF SYSTEMS:    Review of systems was negative unless mentioned above        PHYSICAL EXAM:  Patient Vitals for the past 24 hrs:   BP Temp Temp src Pulse Resp SpO2 Height Weight   07/21/25 0800 153/86 36 " °C (96.8 °F) Temporal 62 18 100 % -- --   07/21/25 0000 156/78 36 °C (96.8 °F) Temporal 64 18 98 % -- --   07/20/25 2000 (!) 146/91 36.2 °C (97.2 °F) Temporal 68 18 96 % -- --   07/20/25 1900 -- -- -- -- -- -- 1.829 m (6') 72.6 kg (160 lb)   07/20/25 1708 169/81 -- -- 65 16 96 % -- --   07/20/25 1613 149/81 -- -- 76 18 97 % -- --   07/20/25 1531 142/74 -- -- 71 16 98 % -- --   07/20/25 1446 152/79 -- -- 65 18 97 % -- --   07/20/25 1352 (!) 148/91 37 °C (98.6 °F) Temporal 75 16 96 % 1.829 m (6') 72.6 kg (160 lb)     Body mass index is 21.7 kg/m².  General: Not in acute distress, A&O x3, alert, coopertive, well-developed  HEENT: Normocephalic, atraumatic, EOMI, moist mucous membranes  Neck: Neck supple, trachea midline, no evidence of trauma  Cardiovascular: RRR, S1 and S2 appreciated, no murmurs rubs gallops appreciated  Respiratory: Clear to auscultation bilaterally without wheezes, rales, rhonchi; no increased work of breathing noted  GI: Abdomen soft, nondistended, nontender to palpation, bowel sounds present  : No munoz present  Extremities: No edema appreciated in lower extremities bilaterally, no cyanosis  MSK: Left elbow with palpable swelling throughout, notably over the bursa and lateral epicondyle. No effusion palpable on exam, but patient was s/p aspiration. Mild erythema but not significant. Full range of motion, minimal tenderness. No warmth to the touch.   Neuro: A&O x3, no focal deficits, strength and sensation intact bilaterally  Skin: Warm and dry, without lesions or rashes      Labs:  Lab Results   Component Value Date    WBC 9.0 07/21/2025    HGB 13.6 07/21/2025    HCT 42.6 07/21/2025    MCV 86 07/21/2025     07/21/2025     Lab Results   Component Value Date    GLUCOSE 119 (H) 07/21/2025    CALCIUM 9.6 07/21/2025     07/21/2025    K 3.8 07/21/2025    CO2 27 07/21/2025     07/21/2025    BUN 22 07/21/2025    CREATININE 1.64 (H) 07/21/2025   ESR: --No results found for:  "\"SEDRATE\"No results found for: \"CRP\"  Lab Results   Component Value Date    ALT 7 (L) 07/20/2025    AST 13 07/20/2025    ALKPHOS 82 07/20/2025    BILITOT 1.7 (H) 07/20/2025       DATA:   Diagnostic tests reviewed for today's visit:    Labs this admission reviewed  Imagings this admission reviewed  Cultures: Reviewed        ASSESSMENT :   -Left elbow erythema and swelling, c/f cellulitis and bursitis, possible joint involvement  -H/o atrial fibrillation, HTN, HLD    PLAN:  -Discontinue antibiotics (vancomycin and zosyn). Monitor fever curve and WBC while off antibiotics, monitor clinical picture  -Will follow up fluid culture (though patient will have received multiple doses of antibiotics at the time of culture collection, which may cloud the picture)    Will continue to follow.    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    Personal history of malignant neoplasm of prostate     History of malignant neoplasm of prostate    Personal history of other diseases of the circulatory system 07/30/2020    History of abnormal electrocardiography    Personal history of other diseases of the circulatory system 07/30/2020    History of cardiac murmur    Personal history of other diseases of the circulatory system 07/30/2020    History of essential hypertension    Personal history of other diseases of the digestive system     History of gastrointestinal hemorrhage    Personal history of other endocrine, nutritional and metabolic disease     History of diabetes mellitus    Transient cerebral ischemic attack, unspecified 07/30/2020    TIA (transient ischemic attack)   [2]   Past Surgical History:  Procedure " Laterality Date    OTHER SURGICAL HISTORY  07/30/2020    Cataract surgery   [3]   Family History  Problem Relation Name Age of Onset    Diabetes Brother          due to underlying condition with hyperglycemia, unspecified whether long term insulin use.    Hypertension Brother          Benign   [4]   Social History  Tobacco Use    Smoking status: Never    Smokeless tobacco: Never   Substance Use Topics    Alcohol use: Defer    Drug use: Never   [5]   Current Facility-Administered Medications:     acetaminophen (Tylenol) tablet 650 mg, 650 mg, oral, q4h PRN **OR** acetaminophen (Tylenol) oral liquid 650 mg, 650 mg, nasogastric tube, q4h PRN **OR** acetaminophen (Tylenol) suppository 650 mg, 650 mg, rectal, q4h PRN, Aden Rosenberg MD    amLODIPine (Norvasc) tablet 5 mg, 5 mg, oral, Daily, Aden Rosenberg MD, 5 mg at 07/21/25 1021    lisinopril tablet 40 mg, 40 mg, oral, Daily, Aden Rosenberg MD, 40 mg at 07/21/25 1021    pantoprazole (ProtoNix) EC tablet 40 mg, 40 mg, oral, q PM, Aden Rosenberg MD, 40 mg at 07/20/25 2013    piperacillin-tazobactam (Zosyn) 3.375 g in dextrose (iso) IV 50 mL, 3.375 g, intravenous, q8h, Aden Rosenberg MD, Stopped at 07/21/25 0903    polyethylene glycol (Glycolax, Miralax) packet 17 g, 17 g, oral, Daily, Aden Rosenberg MD    rivaroxaban (Xarelto) tablet 15 mg, 15 mg, oral, Daily with evening meal, Fadumo Lisa DO    simvastatin (Zocor) tablet 40 mg, 40 mg, oral, Nightly, Aden Rosenberg MD, 40 mg at 07/20/25 2013    vancomycin (Vancocin) 750 mg in dextrose 5%  mL, 750 mg, intravenous, q24h, Aden Rosenberg MD    vancomycin (Vancocin) pharmacy to dose - pharmacy monitoring, , miscellaneous, Daily PRN, Aden Rosenberg MD

## 2025-07-21 NOTE — CARE PLAN
The patient's goals for the shift include sleep    The clinical goals for the shift include maintain safety    Problem: Pain - Adult  Goal: Verbalizes/displays adequate comfort level or baseline comfort level  Outcome: Progressing     Problem: Safety - Adult  Goal: Free from fall injury  Outcome: Progressing     Problem: Discharge Planning  Goal: Discharge to home or other facility with appropriate resources  Outcome: Progressing     Problem: Chronic Conditions and Co-morbidities  Goal: Patient's chronic conditions and co-morbidity symptoms are monitored and maintained or improved  Outcome: Progressing     Problem: Nutrition  Goal: Nutrient intake appropriate for maintaining nutritional needs  Outcome: Progressing

## 2025-07-21 NOTE — PROGRESS NOTES
07/21/25 1000   Discharge Planning   Living Arrangements Spouse/significant other   Support Systems Spouse/significant other   Assistance Needed some   Type of Residence Private residence   Number of Stairs to Enter Residence 1   Number of Stairs Within Residence 0   Home or Post Acute Services In home services   Type of Home Care Services Home OT;Home PT   Expected Discharge Disposition Home H   Financial Resource Strain   How hard is it for you to pay for the very basics like food, housing, medical care, and heating? Not hard   Housing Stability   In the last 12 months, was there a time when you were not able to pay the mortgage or rent on time? N   At any time in the past 12 months, were you homeless or living in a shelter (including now)? N   Transportation Needs   In the past 12 months, has lack of transportation kept you from medical appointments or from getting medications? no   In the past 12 months, has lack of transportation kept you from meetings, work, or from getting things needed for daily living? No   Patient Choice   Provider Choice list and CMS website (https://medicare.gov/care-compare#search) for post-acute Quality and Resource Measure Data were provided and reviewed with: Family   Intensity of Service   Intensity of Service 0-30 min     Patient confused at this time. Spoke to patient's wife to explain my role in discharge planning. She states patient lives at home with her and she helps him if needed. He does not use any DME. PCP is Dr Baker. Per conversation with therapy, they recommend low intensity and a walker. Spoke to wife regarding recommendations. She agrees C would be helpful and would prefer [facility]. Therapy also recommends a walker at d/c. Tarah states she can pick one up on the way home and would like an RX for one. Attending updated.

## 2025-07-21 NOTE — NURSING NOTE
EOS:  Slept well.  Remains pleasantly confused, poor safety awareness, does not use the call light for OOB even after repeated attempts to educate him on use, bed alarm on.  Ambulated to BR several times with standby assist to void, gait slow but steady.  VSS, monitor showing A-fib with PVC's, remains on RA.

## 2025-07-21 NOTE — PROGRESS NOTES
Dash García is a 89 y.o. male on day 1 of admission presenting with No Principal Problem: There is no principal problem currently on the Problem List. Please update the Problem List and refresh..      Subjective   NAEO. Feeling well and endorses no complaints.  States left elbow is much less swollen and painful and he can finally straighten out his elbow again.  Redness is much less. Alert to self    Objective     Last Recorded Vitals  /86 (BP Location: Right arm, Patient Position: Lying)   Pulse 62   Temp 36 °C (96.8 °F) (Temporal)   Resp 18   Wt 72.6 kg (160 lb)   SpO2 100%   Intake/Output last 3 Shifts:    Intake/Output Summary (Last 24 hours) at 7/21/2025 1419  Last data filed at 7/20/2025 2200  Gross per 24 hour   Intake 650 ml   Output --   Net 650 ml       Admission Weight  Weight: 72.6 kg (160 lb) (07/20/25 1352)    Daily Weight  07/20/25 : 72.6 kg (160 lb)    Image Results  ECG 12 lead  Atrial fibrillation with premature ventricular or aberrantly conducted complexes  Indeterminate axis  Right bundle branch block  Septal infarct , age undetermined  Abnormal ECG  When compared with ECG of 20-JUL-2025 13:57, (unconfirmed)  Atrial fibrillation has replaced Wide QRS rhythm  ECG 12 lead  Wide QRS rhythm with frequent Premature ventricular complexes in a pattern of bigeminy  Right bundle branch block  Septal infarct , age undetermined  Abnormal ECG  When compared with ECG of 27-AUG-2022 04:04,  Wide QRS rhythm has replaced Atrial fibrillation      Physical Exam  GENERAL: Alert and oriented x1. No acute distress. elderly  EYES: Anicteric. R pupil nonreactive (known chronic finding), L equal and reactive  HENT: Moist mucous membranes. No scleral icterus.   LUNGS: CTA BL. No accessory muscle use.  CV: RRR. No murmur. No JVD.  ABDOMEN: Soft, non-tender and non-distended. No palpable masses. BS+ x4  EXTREMITIES: No edema. Non-tender.?L elbow significantly less swollen, erythematous and less pain with  passive motion today.  SKIN: No rashes or lesions. Warm.  NEUROLOGIC: No focal neurological deficits.  PSYCHIATRIC: Cooperative.     Relevant Results  Results for orders placed or performed during the hospital encounter of 07/20/25 (from the past 24 hours)   Troponin, High Sensitivity, 1 Hour   Result Value Ref Range    Troponin I, High Sensitivity 6 0 - 20 ng/L   Magnesium   Result Value Ref Range    Magnesium 1.58 (L) 1.60 - 2.40 mg/dL   ECG 12 lead   Result Value Ref Range    Ventricular Rate 72 BPM    QRS Duration 152 ms    QT Interval 420 ms    QTC Calculation(Bazett) 459 ms    R Axis 147 degrees    T Axis 62 degrees    QRS Count 11 beats    Q Onset 224 ms    T Offset 434 ms    QTC Fredericia 446 ms   Blood Culture    Specimen: Peripheral Venipuncture; Blood culture   Result Value Ref Range    Blood Culture Loaded on Instrument - Culture in progress    Blood Culture    Specimen: Peripheral Venipuncture; Blood culture   Result Value Ref Range    Blood Culture Loaded on Instrument - Culture in progress    Magnesium   Result Value Ref Range    Magnesium 1.62 1.60 - 2.40 mg/dL   CBC   Result Value Ref Range    WBC 9.0 4.4 - 11.3 x10*3/uL    nRBC 0.0 0.0 - 0.0 /100 WBCs    RBC 4.97 4.50 - 5.90 x10*6/uL    Hemoglobin 13.6 13.5 - 17.5 g/dL    Hematocrit 42.6 41.0 - 52.0 %    MCV 86 80 - 100 fL    MCH 27.4 26.0 - 34.0 pg    MCHC 31.9 (L) 32.0 - 36.0 g/dL    RDW 15.5 (H) 11.5 - 14.5 %    Platelets 194 150 - 450 x10*3/uL   Basic metabolic panel   Result Value Ref Range    Glucose 119 (H) 74 - 99 mg/dL    Sodium 138 136 - 145 mmol/L    Potassium 3.8 3.5 - 5.3 mmol/L    Chloride 104 98 - 107 mmol/L    Bicarbonate 27 21 - 32 mmol/L    Anion Gap 11 10 - 20 mmol/L    Urea Nitrogen 22 6 - 23 mg/dL    Creatinine 1.64 (H) 0.50 - 1.30 mg/dL    eGFR 40 (L) >60 mL/min/1.73m*2    Calcium 9.6 8.6 - 10.3 mg/dL       Assessment & Plan      Mr. Dash García is a 89yoM [employer]  w/ PMHx of HTN, afib on Xarelto, HLD,  unspecified dementia (Aox1 is baseline) who presented for generalized weakness and L elbow swelling, pain, tenderness and overlying erythema worsening over several days. Admitted for c/f cellulitis in setting of L olecranon bursitis.    #Cellulitis in setting of L olecranon bursitis  #L elbow joint effusion  -Dx includes septic arthritis, gout, infectious arthritis such as viral, RA   - Ortho consulted, did perform bedside joint aspiration, sterile culture pending. Observing overnight on IV abx  -ID consulted and case discussed with consultant via [employer] chat. joceline and ruth ann MARIN, will start rocephin  -ESR 41, CRP 18  -uric acid and RA pending  -Daily CBC  -FU sterile culture, unfortunately not enough fluid for gram stain or crystals  -PRN tylenol    HTN  afib on Xarelto  HLD  CKD3  unspecified dementia (Aox1 is baseline)  -Daily RFP and Mag, goal K>4 and Mg>2  -resume xarelto today  -cont home meds without changes    IVF: not indicated  Tele: indicated  Consults: ortho surg, ID  FEN/GI: regular  DVT ppx: lovenox, SCDs  Access: PIV   Code status: assumed full, wife is not answering phone despite multiple voicemails    Dispo: Likely tomorrow to home with [facility] pending abx choice and aspiration results    Complexity: high    Fadumo Lisa DO

## 2025-07-21 NOTE — PROGRESS NOTES
Vancomycin Dosing by Pharmacy- Cessation of Therapy    Consult to pharmacy for vancomycin dosing has been discontinued by the prescriber, pharmacy will sign off at this time.    Please call pharmacy if there are further questions or re-enter a consult if vancomycin is resumed.     HERNANDEZ ROMANO, PharmD

## 2025-07-22 ENCOUNTER — HOME HEALTH ADMISSION (OUTPATIENT)
Dept: HOME HEALTH SERVICES | Facility: HOME HEALTH | Age: 89
End: 2025-07-22
Payer: MEDICARE

## 2025-07-22 ENCOUNTER — APPOINTMENT (OUTPATIENT)
Dept: RADIOLOGY | Facility: HOSPITAL | Age: 89
End: 2025-07-22
Payer: MEDICARE

## 2025-07-22 LAB
ALBUMIN SERPL BCP-MCNC: 3.5 G/DL (ref 3.4–5)
ALP SERPL-CCNC: 65 U/L (ref 33–136)
ALT SERPL W P-5'-P-CCNC: 4 U/L (ref 10–52)
ANION GAP SERPL CALC-SCNC: 13 MMOL/L (ref 10–20)
AST SERPL W P-5'-P-CCNC: 9 U/L (ref 9–39)
BASOPHILS # BLD AUTO: 0.07 X10*3/UL (ref 0–0.1)
BASOPHILS NFR BLD AUTO: 1.2 %
BILIRUB SERPL-MCNC: 0.8 MG/DL (ref 0–1.2)
BUN SERPL-MCNC: 21 MG/DL (ref 6–23)
CALCIUM SERPL-MCNC: 9.3 MG/DL (ref 8.6–10.3)
CHLORIDE SERPL-SCNC: 104 MMOL/L (ref 98–107)
CO2 SERPL-SCNC: 23 MMOL/L (ref 21–32)
CREAT SERPL-MCNC: 1.64 MG/DL (ref 0.5–1.3)
EGFRCR SERPLBLD CKD-EPI 2021: 40 ML/MIN/1.73M*2
EOSINOPHIL # BLD AUTO: 0.07 X10*3/UL (ref 0–0.4)
EOSINOPHIL NFR BLD AUTO: 1.2 %
ERYTHROCYTE [DISTWIDTH] IN BLOOD BY AUTOMATED COUNT: 15.5 % (ref 11.5–14.5)
GLUCOSE SERPL-MCNC: 138 MG/DL (ref 74–99)
HCT VFR BLD AUTO: 41.7 % (ref 41–52)
HGB BLD-MCNC: 12.9 G/DL (ref 13.5–17.5)
IMM GRANULOCYTES # BLD AUTO: 0.01 X10*3/UL (ref 0–0.5)
IMM GRANULOCYTES NFR BLD AUTO: 0.2 % (ref 0–0.9)
LYMPHOCYTES # BLD AUTO: 0.69 X10*3/UL (ref 0.8–3)
LYMPHOCYTES NFR BLD AUTO: 12.3 %
MAGNESIUM SERPL-MCNC: 2 MG/DL (ref 1.6–2.4)
MCH RBC QN AUTO: 26.9 PG (ref 26–34)
MCHC RBC AUTO-ENTMCNC: 30.9 G/DL (ref 32–36)
MCV RBC AUTO: 87 FL (ref 80–100)
MONOCYTES # BLD AUTO: 0.46 X10*3/UL (ref 0.05–0.8)
MONOCYTES NFR BLD AUTO: 8.2 %
MRSA DNA SPEC QL NAA+PROBE: NOT DETECTED
NEUTROPHILS # BLD AUTO: 4.33 X10*3/UL (ref 1.6–5.5)
NEUTROPHILS NFR BLD AUTO: 76.9 %
NRBC BLD-RTO: 0 /100 WBCS (ref 0–0)
PHOSPHATE SERPL-MCNC: 2.8 MG/DL (ref 2.5–4.9)
PLATELET # BLD AUTO: 199 X10*3/UL (ref 150–450)
POTASSIUM SERPL-SCNC: 3.2 MMOL/L (ref 3.5–5.3)
PROT SERPL-MCNC: 6.8 G/DL (ref 6.4–8.2)
RBC # BLD AUTO: 4.8 X10*6/UL (ref 4.5–5.9)
RHEUMATOID FACT SER NEPH-ACNC: 11 IU/ML (ref 0–15)
SODIUM SERPL-SCNC: 137 MMOL/L (ref 136–145)
WBC # BLD AUTO: 5.6 X10*3/UL (ref 4.4–11.3)

## 2025-07-22 PROCEDURE — 85025 COMPLETE CBC W/AUTO DIFF WBC: CPT

## 2025-07-22 PROCEDURE — 73221 MRI JOINT UPR EXTREM W/O DYE: CPT | Mod: LT

## 2025-07-22 PROCEDURE — 73221 MRI JOINT UPR EXTREM W/O DYE: CPT | Mod: LEFT SIDE | Performed by: STUDENT IN AN ORGANIZED HEALTH CARE EDUCATION/TRAINING PROGRAM

## 2025-07-22 PROCEDURE — 2500000002 HC RX 250 W HCPCS SELF ADMINISTERED DRUGS (ALT 637 FOR MEDICARE OP, ALT 636 FOR OP/ED)

## 2025-07-22 PROCEDURE — 2500000001 HC RX 250 WO HCPCS SELF ADMINISTERED DRUGS (ALT 637 FOR MEDICARE OP)

## 2025-07-22 PROCEDURE — 84100 ASSAY OF PHOSPHORUS: CPT

## 2025-07-22 PROCEDURE — 83735 ASSAY OF MAGNESIUM: CPT

## 2025-07-22 PROCEDURE — 80053 COMPREHEN METABOLIC PANEL: CPT

## 2025-07-22 PROCEDURE — 2500000004 HC RX 250 GENERAL PHARMACY W/ HCPCS (ALT 636 FOR OP/ED)

## 2025-07-22 PROCEDURE — 1200000002 HC GENERAL ROOM WITH TELEMETRY DAILY

## 2025-07-22 PROCEDURE — 87641 MR-STAPH DNA AMP PROBE: CPT | Performed by: INTERNAL MEDICINE

## 2025-07-22 PROCEDURE — 97530 THERAPEUTIC ACTIVITIES: CPT | Mod: GP,CQ

## 2025-07-22 PROCEDURE — 99233 SBSQ HOSP IP/OBS HIGH 50: CPT

## 2025-07-22 PROCEDURE — 97116 GAIT TRAINING THERAPY: CPT | Mod: GP,CQ

## 2025-07-22 PROCEDURE — 2500000001 HC RX 250 WO HCPCS SELF ADMINISTERED DRUGS (ALT 637 FOR MEDICARE OP): Performed by: STUDENT IN AN ORGANIZED HEALTH CARE EDUCATION/TRAINING PROGRAM

## 2025-07-22 PROCEDURE — 36415 COLL VENOUS BLD VENIPUNCTURE: CPT

## 2025-07-22 PROCEDURE — 2500000004 HC RX 250 GENERAL PHARMACY W/ HCPCS (ALT 636 FOR OP/ED): Performed by: INTERNAL MEDICINE

## 2025-07-22 RX ORDER — HALOPERIDOL LACTATE 5 MG/ML
5 INJECTION, SOLUTION INTRAMUSCULAR ONCE
Status: COMPLETED | OUTPATIENT
Start: 2025-07-22 | End: 2025-07-22

## 2025-07-22 RX ORDER — POTASSIUM CHLORIDE 1.5 G/1.58G
40 POWDER, FOR SOLUTION ORAL ONCE
Status: COMPLETED | OUTPATIENT
Start: 2025-07-22 | End: 2025-07-22

## 2025-07-22 RX ORDER — DIPHENHYDRAMINE HYDROCHLORIDE 50 MG/ML
25 INJECTION, SOLUTION INTRAMUSCULAR; INTRAVENOUS ONCE
Status: COMPLETED | OUTPATIENT
Start: 2025-07-22 | End: 2025-07-22

## 2025-07-22 RX ADMIN — DIPHENHYDRAMINE HYDROCHLORIDE 25 MG: 50 INJECTION INTRAMUSCULAR; INTRAVENOUS at 20:17

## 2025-07-22 RX ADMIN — AMLODIPINE BESYLATE 5 MG: 5 TABLET ORAL at 08:59

## 2025-07-22 RX ADMIN — RIVAROXABAN 15 MG: 15 TABLET, FILM COATED ORAL at 16:19

## 2025-07-22 RX ADMIN — CEFTRIAXONE 1 G: 1 INJECTION, SOLUTION INTRAVENOUS at 14:51

## 2025-07-22 RX ADMIN — HALOPERIDOL LACTATE 5 MG: 5 INJECTION, SOLUTION INTRAMUSCULAR at 20:17

## 2025-07-22 RX ADMIN — LISINOPRIL 40 MG: 40 TABLET ORAL at 08:59

## 2025-07-22 RX ADMIN — POTASSIUM CHLORIDE 40 MEQ: 1.5 POWDER, FOR SOLUTION ORAL at 14:52

## 2025-07-22 ASSESSMENT — COGNITIVE AND FUNCTIONAL STATUS - GENERAL
CLIMB 3 TO 5 STEPS WITH RAILING: A LOT
WALKING IN HOSPITAL ROOM: A LITTLE
MOVING FROM LYING ON BACK TO SITTING ON SIDE OF FLAT BED WITH BEDRAILS: A LITTLE
CLIMB 3 TO 5 STEPS WITH RAILING: A LOT
WALKING IN HOSPITAL ROOM: A LITTLE
DAILY ACTIVITIY SCORE: 23
MOVING TO AND FROM BED TO CHAIR: A LITTLE
TOILETING: A LITTLE
TOILETING: A LITTLE
STANDING UP FROM CHAIR USING ARMS: A LITTLE
WALKING IN HOSPITAL ROOM: A LITTLE
MOBILITY SCORE: 17
MOBILITY SCORE: 19
MOBILITY SCORE: 19
CLIMB 3 TO 5 STEPS WITH RAILING: A LOT
DAILY ACTIVITIY SCORE: 23
STANDING UP FROM CHAIR USING ARMS: A LITTLE
STANDING UP FROM CHAIR USING ARMS: A LITTLE
TURNING FROM BACK TO SIDE WHILE IN FLAT BAD: A LITTLE

## 2025-07-22 ASSESSMENT — PAIN SCALES - GENERAL
PAINLEVEL_OUTOF10: 0 - NO PAIN

## 2025-07-22 ASSESSMENT — PAIN - FUNCTIONAL ASSESSMENT
PAIN_FUNCTIONAL_ASSESSMENT: 0-10

## 2025-07-22 NOTE — CARE PLAN
The patient's goals for the shift include      The clinical goals for the shift include see poc      Problem: Pain - Adult  Goal: Verbalizes/displays adequate comfort level or baseline comfort level  Outcome: Progressing     Problem: Safety - Adult  Goal: Free from fall injury  Outcome: Progressing     Problem: Discharge Planning  Goal: Discharge to home or other facility with appropriate resources  Outcome: Progressing     Problem: Chronic Conditions and Co-morbidities  Goal: Patient's chronic conditions and co-morbidity symptoms are monitored and maintained or improved  Outcome: Progressing       Over the shift, the patient did not make progress toward the following goals. Barriers to progression include

## 2025-07-22 NOTE — PROGRESS NOTES
INPATIENT PROGRESS NOTES    PATIENT NAME: Dash García    MRN: [id number]  SERVICE DATE:  7/22/2025   SERVICE TIME:  10:32 AM      SUBJECTIVE  Afebrile, no leukocytosis  No events overnight   Reports that he is doing better, and states his elbow is not bothering him at all. No complaints at this time.       OBJECTIVE  PHYSICAL EXAM:   Patient Vitals for the past 24 hrs:   BP Temp Temp src Pulse Resp SpO2   07/22/25 0800 154/86 35.8 °C (96.4 °F) Temporal 62 20 98 %   07/22/25 0400 162/87 36.3 °C (97.3 °F) Temporal 67 16 99 %   07/21/25 2000 148/79 36 °C (96.8 °F) Temporal 71 16 99 %   07/21/25 1600 138/80 36.2 °C (97.2 °F) Temporal 56 18 97 %       General: Not in acute distress, A&O x3, alert, coopertive, well-developed  HEENT: Normocephalic, atraumatic, EOMI, moist mucous membranes  Neck: Neck supple, trachea midline, no evidence of trauma  Cardiovascular: RRR, S1 and S2 appreciated, no murmurs rubs gallops appreciated  Respiratory: Clear to auscultation bilaterally without wheezes, rales, rhonchi; no increased work of breathing noted  GI: Abdomen soft, nondistended, nontender to palpation, bowel sounds present  : No munoz present  Extremities: No edema appreciated in lower extremities bilaterally, no cyanosis  MSK: Left elbow with improved swelling, no warmth to the touch. Very minimal erythema. Some pain with full flexion and extension.   Neuro: A&O x3, no focal deficits, strength and sensation intact bilaterally  Skin: Warm and dry, without lesions or rashes      Labs:  Lab Results   Component Value Date    WBC 5.6 07/22/2025    HGB 12.9 (L) 07/22/2025    HCT 41.7 07/22/2025    MCV 87 07/22/2025     07/22/2025     Lab Results   Component Value Date    GLUCOSE 138 (H) 07/22/2025    CALCIUM 9.3 07/22/2025     07/22/2025    K 3.2 (L) 07/22/2025    CO2 23 07/22/2025     07/22/2025    BUN 21 07/22/2025    CREATININE 1.64 (H) 07/22/2025   ESR: --  Lab Results   Component Value Date    SEDRATE  41 (H) 07/21/2025     Lab Results   Component Value Date    CRP 18.17 (H) 07/21/2025     Lab Results   Component Value Date    ALT 4 (L) 07/22/2025    AST 9 07/22/2025    ALKPHOS 65 07/22/2025    BILITOT 0.8 07/22/2025         DATA:   Diagnostic tests reviewed for today's visit:    Labs this admission reviewed  Imagings this admission reviewed  Cultures: Reviewed  -Blood cultures x2 7/20: No growth at one day  -Joint aspiration 7/21: no organisms seen; not enough fluid collected for gram stain and crystals  -MRSA PCR negative      ASSESSMENT :   -Left elbow erythema and swelling, c/f cellulitis and bursitis, possible joint involvement  -S/p left elbow joint aspiration 7/21 w/ orthopedic surgery  -H/o atrial fibrillation, HTN, HLD    PLAN:  -Vancomycin and zosyn were discontinued yesterday.  -Orthopedic surgery performed left elbow joint aspiration yesterday, and recommended observation while on IV antibiotics, so ceftriaxone was started 7/21. Recommend continuing ceftriaxone for now and continue to monitor for improvement.   -Closely monitor for antibiotics side effects including rash, Diarrhea/CDI, thrombocytopenia, XOCHITL, etc.    Patient seen and discussed with attending physician    Catarina Rodríguez DO  Internal Medicine PGY-2 Resident

## 2025-07-22 NOTE — PROGRESS NOTES
07/22/25 1158   Discharge Planning   Home or Post Acute Services In home services   Type of Home Care Services Home nursing visits;Home OT;Home PT   Expected Discharge Disposition Home H  ([facility])   Intensity of Service   Intensity of Service >30 min     Spoke with patient's wife after attending rounded due to concerns regarding safe d/c plan. Discussed concerns and SNF vs HHC with patient's spouse. She states she is worried that patient would become even more confused at a SNF, since he is more confused in the hospital than at his baseline. She confirms that she will be with patient 24/7 at home and that she feels safe for him to return home with her at d/c with [facility] for therapy. Pt's spouse is requesting to be updated if patient is going to be discharging today. Updated attending. Patient will need a walker at d/c.

## 2025-07-22 NOTE — PROGRESS NOTES
Dash García is a 89 y.o. male on day 2 of admission presenting with No Principal Problem: There is no principal problem currently on the Problem List. Please update the Problem List and refresh..    Subjective   Mr. García tells me that he has minimal left elbow pain, worst with elbow flexion.        Objective     Physical Exam  Vitals reviewed.   HENT:      Head: Normocephalic.      Mouth/Throat:      Mouth: Mucous membranes are moist.      Pharynx: Oropharynx is clear.     Eyes:      Extraocular Movements: Extraocular movements intact.       Cardiovascular:      Rate and Rhythm: Normal rate.   Pulmonary:      Effort: Pulmonary effort is normal.   Abdominal:      Palpations: Abdomen is soft.     Musculoskeletal:      Comments: Left elbow with  boggy edema/erythema extending prox and distally from elbow, I outlined region 7/22 am, some elbow pain on end flexion and end extension. Sensation intact left UE, radial pulse palpable 2+/2     Skin:     General: Skin is warm and dry.      Capillary Refill: Capillary refill takes less than 2 seconds.     Neurological:      General: No focal deficit present.      Mental Status: He is alert. Mental status is at baseline.     Psychiatric:         Mood and Affect: Mood normal.         Last Recorded Vitals  Blood pressure 154/86, pulse 62, temperature 35.8 °C (96.4 °F), temperature source Temporal, resp. rate 20, height 1.829 m (6'), weight 72.6 kg (160 lb), SpO2 98%.  Intake/Output last 3 Shifts:  I/O last 3 completed shifts:  In: 150 (2.1 mL/kg) [I.V.:50 (0.7 mL/kg); IV Piggyback:100]  Out: - (0 mL/kg)   Weight: 72.6 kg     Relevant Results      Scheduled medications  Scheduled Medications[1]  Continuous medications  Continuous Medications[2]  PRN medications  PRN Medications[3]  Results for orders placed or performed during the hospital encounter of 07/20/25 (from the past 24 hours)   MRSA Surveillance for Vancomycin De-escalation, PCR    Specimen: Anterior Nares; Swab    Result Value Ref Range    MRSA PCR Not Detected Not Detected   CBC and Auto Differential   Result Value Ref Range    WBC 5.6 4.4 - 11.3 x10*3/uL    nRBC 0.0 0.0 - 0.0 /100 WBCs    RBC 4.80 4.50 - 5.90 x10*6/uL    Hemoglobin 12.9 (L) 13.5 - 17.5 g/dL    Hematocrit 41.7 41.0 - 52.0 %    MCV 87 80 - 100 fL    MCH 26.9 26.0 - 34.0 pg    MCHC 30.9 (L) 32.0 - 36.0 g/dL    RDW 15.5 (H) 11.5 - 14.5 %    Platelets 199 150 - 450 x10*3/uL    Neutrophils % 76.9 40.0 - 80.0 %    Immature Granulocytes %, Automated 0.2 0.0 - 0.9 %    Lymphocytes % 12.3 13.0 - 44.0 %    Monocytes % 8.2 2.0 - 10.0 %    Eosinophils % 1.2 0.0 - 6.0 %    Basophils % 1.2 0.0 - 2.0 %    Neutrophils Absolute 4.33 1.60 - 5.50 x10*3/uL    Immature Granulocytes Absolute, Automated 0.01 0.00 - 0.50 x10*3/uL    Lymphocytes Absolute 0.69 (L) 0.80 - 3.00 x10*3/uL    Monocytes Absolute 0.46 0.05 - 0.80 x10*3/uL    Eosinophils Absolute 0.07 0.00 - 0.40 x10*3/uL    Basophils Absolute 0.07 0.00 - 0.10 x10*3/uL   Magnesium   Result Value Ref Range    Magnesium 2.00 1.60 - 2.40 mg/dL   Comprehensive Metabolic Panel   Result Value Ref Range    Glucose 138 (H) 74 - 99 mg/dL    Sodium 137 136 - 145 mmol/L    Potassium 3.2 (L) 3.5 - 5.3 mmol/L    Chloride 104 98 - 107 mmol/L    Bicarbonate 23 21 - 32 mmol/L    Anion Gap 13 10 - 20 mmol/L    Urea Nitrogen 21 6 - 23 mg/dL    Creatinine 1.64 (H) 0.50 - 1.30 mg/dL    eGFR 40 (L) >60 mL/min/1.73m*2    Calcium 9.3 8.6 - 10.3 mg/dL    Albumin 3.5 3.4 - 5.0 g/dL    Alkaline Phosphatase 65 33 - 136 U/L    Total Protein 6.8 6.4 - 8.2 g/dL    AST 9 9 - 39 U/L    Bilirubin, Total 0.8 0.0 - 1.2 mg/dL    ALT 4 (L) 10 - 52 U/L   Phosphorus   Result Value Ref Range    Phosphorus 2.8 2.5 - 4.9 mg/dL             XR elbow left 1-2 views  Result Date: 7/20/2025  Interpreted By:  Cem Vazquez, STUDY: XR ELBOW LEFT 1-2 VIEWS   INDICATION: Signs/Symptoms:Suspected joint infection rule out fracture.   COMPARISON: None   ACCESSION NUMBER(S):  [id number]   ORDERING CLINICIAN: APRIL DAVENPORT   FINDINGS: Likely left elbow joint effusion. No evidence of fracture or malalignment.       Suspected left elbow joint effusion. No osseous abnormality seen.   Signed by: Cem Vazquez 7/20/2025 3:12 PM Dictation workstation:   [id number]             Assessment & Plan  Benign essential hypertension    Dyslipidemia    Paroxysmal A-fib (Multi)    Cellulitis of left elbow    Joint effusion of elbow, left  -aspiration done 7/21, negative gram stain, culture: NGTD on prelim, will follow  -MRI left elbow pending  -continue rocephin as recommended by ID service  -seen on rounds with Dr. WILMER Pruitt      I spent 25 minutes in the professional and overall care of this patient.      Monserrat Parham PA-C             [1] amLODIPine, 5 mg, oral, Daily  cefTRIAXone, 1 g, intravenous, q24h  lisinopril, 40 mg, oral, Daily  pantoprazole, 40 mg, oral, q PM  polyethylene glycol, 17 g, oral, Daily  rivaroxaban, 15 mg, oral, Daily with evening meal  simvastatin, 40 mg, oral, Nightly    [2]    [3] PRN medications: acetaminophen **OR** acetaminophen **OR** acetaminophen, melatonin

## 2025-07-22 NOTE — ASSESSMENT & PLAN NOTE
-aspiration done 7/21, negative gram stain, culture: NGTD on prelim, will follow  -MRI left elbow pending  -continue rocephin as recommended by ID service  -seen on rounds with Dr. WILMER Pruitt

## 2025-07-22 NOTE — PROGRESS NOTES
Dash García is a 89 y.o. male on day 2 of admission presenting with No Principal Problem: There is no principal problem currently on the Problem List. Please update the Problem List and refresh..      Subjective   NAEO.  Remains confused though able to be redirected by staff.  Denies complaints at this time and feels left elbow is much better though motion remains restricted.    Objective     Last Recorded Vitals  /84 (BP Location: Right arm, Patient Position: Sitting)   Pulse 77   Temp 36.2 °C (97.2 °F) (Temporal)   Resp 20   Wt 72.6 kg (160 lb)   SpO2 100%   Intake/Output last 3 Shifts:    Intake/Output Summary (Last 24 hours) at 7/22/2025 1802  Last data filed at 7/22/2025 1600  Gross per 24 hour   Intake 900 ml   Output --   Net 900 ml       Admission Weight  Weight: 72.6 kg (160 lb) (07/20/25 1352)    Daily Weight  07/20/25 : 72.6 kg (160 lb)    Image Results  ECG 12 lead  Atrial fibrillation with premature ventricular or aberrantly conducted complexes  Indeterminate axis  Right bundle branch block  Septal infarct , age undetermined  Abnormal ECG  When compared with ECG of 20-JUL-2025 13:57, (unconfirmed)  Atrial fibrillation has replaced Wide QRS rhythm  ECG 12 lead  Wide QRS rhythm with frequent Premature ventricular complexes in a pattern of bigeminy  Right bundle branch block  Septal infarct , age undetermined  Abnormal ECG  When compared with ECG of 27-AUG-2022 04:04,  Wide QRS rhythm has replaced Atrial fibrillation      Physical Exam  GENERAL: Alert and oriented x1. No acute distress. elderly  EYES: Anicteric. R pupil nonreactive (known chronic finding), L equal and reactive  HENT: Moist mucous membranes. No scleral icterus.   LUNGS: CTA BL. No accessory muscle use.  CV: RRR. No murmur. No JVD.  ABDOMEN: Soft, non-tender and non-distended. No palpable masses. BS+ x4  EXTREMITIES: No edema. Non-tender.?L elbow significantly less swollen, erythematous and less pain with passive motion  today.  SKIN: No rashes or lesions. Warm.  NEUROLOGIC: No focal neurological deficits.  PSYCHIATRIC: Cooperative.      Relevant Results  Results for orders placed or performed during the hospital encounter of 07/20/25 (from the past 24 hours)   MRSA Surveillance for Vancomycin De-escalation, PCR    Specimen: Anterior Nares; Swab   Result Value Ref Range    MRSA PCR Not Detected Not Detected   CBC and Auto Differential   Result Value Ref Range    WBC 5.6 4.4 - 11.3 x10*3/uL    nRBC 0.0 0.0 - 0.0 /100 WBCs    RBC 4.80 4.50 - 5.90 x10*6/uL    Hemoglobin 12.9 (L) 13.5 - 17.5 g/dL    Hematocrit 41.7 41.0 - 52.0 %    MCV 87 80 - 100 fL    MCH 26.9 26.0 - 34.0 pg    MCHC 30.9 (L) 32.0 - 36.0 g/dL    RDW 15.5 (H) 11.5 - 14.5 %    Platelets 199 150 - 450 x10*3/uL    Neutrophils % 76.9 40.0 - 80.0 %    Immature Granulocytes %, Automated 0.2 0.0 - 0.9 %    Lymphocytes % 12.3 13.0 - 44.0 %    Monocytes % 8.2 2.0 - 10.0 %    Eosinophils % 1.2 0.0 - 6.0 %    Basophils % 1.2 0.0 - 2.0 %    Neutrophils Absolute 4.33 1.60 - 5.50 x10*3/uL    Immature Granulocytes Absolute, Automated 0.01 0.00 - 0.50 x10*3/uL    Lymphocytes Absolute 0.69 (L) 0.80 - 3.00 x10*3/uL    Monocytes Absolute 0.46 0.05 - 0.80 x10*3/uL    Eosinophils Absolute 0.07 0.00 - 0.40 x10*3/uL    Basophils Absolute 0.07 0.00 - 0.10 x10*3/uL   Magnesium   Result Value Ref Range    Magnesium 2.00 1.60 - 2.40 mg/dL   Comprehensive Metabolic Panel   Result Value Ref Range    Glucose 138 (H) 74 - 99 mg/dL    Sodium 137 136 - 145 mmol/L    Potassium 3.2 (L) 3.5 - 5.3 mmol/L    Chloride 104 98 - 107 mmol/L    Bicarbonate 23 21 - 32 mmol/L    Anion Gap 13 10 - 20 mmol/L    Urea Nitrogen 21 6 - 23 mg/dL    Creatinine 1.64 (H) 0.50 - 1.30 mg/dL    eGFR 40 (L) >60 mL/min/1.73m*2    Calcium 9.3 8.6 - 10.3 mg/dL    Albumin 3.5 3.4 - 5.0 g/dL    Alkaline Phosphatase 65 33 - 136 U/L    Total Protein 6.8 6.4 - 8.2 g/dL    AST 9 9 - 39 U/L    Bilirubin, Total 0.8 0.0 - 1.2 mg/dL    ALT  4 (L) 10 - 52 U/L   Phosphorus   Result Value Ref Range    Phosphorus 2.8 2.5 - 4.9 mg/dL         Assessment & Plan      Mr. Dash García is a 89yoM [employer]  w/ PMHx of HTN, afib on Xarelto, HLD, unspecified dementia (Aox1 is baseline) who presented for generalized weakness and L elbow swelling, pain, tenderness and overlying erythema worsening over several days. Admitted for c/f cellulitis in setting of L olecranon bursitis.     #Cellulitis in setting of L olecranon bursitis  #L elbow joint effusion  #Hypokalemia  -Dx includes septic arthritis, gout, infectious arthritis such as viral, RA   - Ortho consulted, did perform bedside joint aspiration, sterile culture pending. Observing overnight on IV abx.  Recommend MRI to rule out abscess  -ID consulted and case discussed with consultant via [employer] chat. joceline and ruth ann MARIN, will start rocephin.  Continue on IV antibiotics, likely transition to Augmentin at this  -ESR 41, CRP 18  -uric acid and RA wnl  -Daily CBC  -FU sterile culture, unfortunately not enough fluid for gram stain or crystals  -PRN tylenol     HTN  afib on Xarelto  HLD  CKD3  unspecified dementia (Aox1 is baseline)  -Daily RFP and Mag, goal K>4 and Mg>2  -resume xarelto today  -cont home meds without changes     IVF: not indicated  Tele: indicated  Consults: ortho surg, ID  FEN/GI: regular  DVT ppx: lovenox, SCDs  Access: PIV   Code status: assumed full, wife is not answering phone despite multiple voicemails     Dispo: Likely tomorrow to home with [facility] pending abx choice and aspiration results     Complexity: high    Fadumo Lisa DO

## 2025-07-22 NOTE — PROGRESS NOTES
Physical Therapy    Physical Therapy    Physical Therapy Treatment    Patient Name: Dash García  MRN: [id number]  Today's Date: 7/22/2025  Time Calculation  Start Time: 1048  Stop Time: 1114  Time Calculation (min): 26 min     3025/3025-A       07/22/25 1048   PT  Visit   PT Received On 07/22/25   Response to Previous Treatment Patient with no complaints from previous session.   General   Reason for Referral P/w with generalized weakness and new swelling of the left elbow.  Per his wife the patient was unable to stand from seated position at home today, which is a change from baseline.  Swelling and redness of the left elbow were also noted today. No trauma. X-ray of the left elbow was done showing suspected left elbow joint effusion. Pt now s/p bedside aspiration of L elbow by Dr. Owens 7/20.   Referred By Dr. Lisa   Past Medical History Relevant to Rehab A-fib (on Xarelto), HTN, RBBB, GERD, TIIDM, Dementia   Prior to Session Communication Bedside nurse   Patient Position Received Bed, 2 rail up;Alarm on   Preferred Learning Style verbal   General Comment Pt is pleasantly confused, agreeable to therapy and cleared for participation   Precautions   Medical Precautions Fall precautions   Pain Assessment   Pain Assessment 0-10   0-10 (Numeric) Pain Score   (L elbow pain, did not rate.)   Pain Interventions Repositioned  (Pillow under L elbow)   Cognition   Overall Cognitive Status Impaired at baseline   Orientation Level Disoriented to time;Disoriented to situation   Cognition Comments baseline dementia; pleasantly confused   Sustained Attention Impaired   Insight Moderate   Impulsive Mildly   Processing Speed Delayed   Therapeutic Exercise   Therapeutic Exercise Performed Yes   Therapeutic Exercise Activity 1 AP, LAQ, marching x20   Bed Mobility   Bed Mobility Yes   Bed Mobility 1   Bed Mobility 1 Supine to sitting   Level of Assistance 1 Close supervision   Bed Mobility Comments 1 HOB elevated    Ambulation/Gait Training   Ambulation/Gait Training Performed Yes   Ambulation/Gait Training 1   Surface 1 Level tile   Device 1 No device   Gait Support Devices Gait belt   Assistance 1 Contact guard;Minimal verbal cues   Quality of Gait 1 Diminished heel strike;WBOS;Forward flexed posture   Comments/Distance (ft) 1 80' decreased sang, mild instability requiring CGA for safety d/t decreased safety awareness. Pt refused use of WW for increased stability despite cues and education   Transfers   Transfer Yes   Transfer 1   Transfer From 1 Bed to   Transfer to 1 Stand   Technique 1 Sit to stand   Transfer Device 1 Gait belt   Transfer Level of Assistance 1 Minimum assistance;Minimal verbal cues   Transfers 2   Transfer From 2 Chair with arms to   Transfer to 2 Sit;Stand   Technique 2 Sit to stand;Stand to sit   Transfer Device 2 Gait belt   Transfer Level of Assistance 2 Contact guard;Close supervision;Minimal verbal cues   Trials/Comments 2 x8 including x5 consecutive for  standardized test for increased strength and stabilitywith functional transfers   Other Activity   Other Activity Performed Yes   Other Activity 1 seated dynamic balance at EOB reaching outside MATY while crossing midline. SBA for safety   Activity Tolerance   Endurance Tolerates 10 - 20 min exercise with multiple rests   PT Assessment   Rehab Prognosis Good   End of Session Communication Bedside nurse   Assessment Comment Pt is pleasantly confused and puts forth good effort. Mild unstable with ambulation.   End of Session Patient Position Up in chair;Alarm on     Outcome Measures:  [facility] Basic Mobility  Turning from your back to your side while in a flat bed without using bedrails: A little  Moving from lying on your back to sitting on the side of a flat bed without using bedrails: A little  Moving to and from bed to chair (including a wheelchair): A little  Standing up from a chair using your arms (e.g. wheelchair or bedside chair): A  little  To walk in hospital room: A little  Climbing 3-5 steps with railing: A lot  Basic Mobility - Total Score: 17                             EDUCATION:     Education Documentation  Precautions, taught by Naye Anderson PTA at 7/22/2025  1:21 PM.  Learner: Patient  Readiness: Acceptance  Method: Explanation, Demonstration  Response: Verbalizes Understanding, Needs Reinforcement, Demonstrated Understanding    Body Mechanics, taught by Naye Anderson PTA at 7/22/2025  1:21 PM.  Learner: Patient  Readiness: Acceptance  Method: Explanation, Demonstration  Response: Verbalizes Understanding, Needs Reinforcement, Demonstrated Understanding    Home Exercise Program, taught by Naye Anderson PTA at 7/22/2025  1:21 PM.  Learner: Patient  Readiness: Acceptance  Method: Explanation, Demonstration  Response: Verbalizes Understanding, Needs Reinforcement, Demonstrated Understanding    Mobility Training, taught by Naye Anderson PTA at 7/22/2025  1:21 PM.  Learner: Patient  Readiness: Acceptance  Method: Explanation, Demonstration  Response: Verbalizes Understanding, Needs Reinforcement, Demonstrated Understanding    Education Comments  No comments found.        GOALS:  Encounter Problems       Encounter Problems (Active)       PT Problem       Pt will be able to complete all bed mobility tasks (rolling, sit <> sup) mod I.          Start:  07/21/25    Expected End:  08/04/25            Pt will be able to complete all transfers with LRD SBA demonstrating good safety awareness and proper body mechanics.        Start:  07/21/25    Expected End:  08/04/25            Pt will be able to ambulate >/= 100 ft with LRD SBA with good safety awareness and stability.        Start:  07/21/25    Expected End:  08/04/25            Pt will complete supine, seated and standing exercises to maintain/improve overall strength with minimal verbal cues.         Start:  07/21/25    Expected End:  08/04/25

## 2025-07-22 NOTE — CARE PLAN
1635:Pt with 6 beat run Vtach at this time. Pt. Asymptomatic, MD notified at this time. No new orders.    EOS: Pt. Stable throughout this shift and was able to have an uneventful day. Pt. Had no c/o pain and no acute changes. Pt. Was able to ambulate to the bathroom throughout this shift with staff assistance. Pt. Impulsive and Alert to self only. Pt. Wife at bedside this shift and updated on pt. Status and plan. Pt. Awaiting MRI, pt. Currently resting in bed at this time call light within reach. Bed alarm on.

## 2025-07-23 DIAGNOSIS — I10 ESSENTIAL HYPERTENSION, BENIGN: ICD-10-CM

## 2025-07-23 LAB
ALBUMIN SERPL BCP-MCNC: 3.2 G/DL (ref 3.4–5)
ALP SERPL-CCNC: 50 U/L (ref 33–136)
ALT SERPL W P-5'-P-CCNC: 7 U/L (ref 10–52)
ANION GAP SERPL CALC-SCNC: 11 MMOL/L (ref 10–20)
AST SERPL W P-5'-P-CCNC: 30 U/L (ref 9–39)
BASOPHILS # BLD AUTO: 0.06 X10*3/UL (ref 0–0.1)
BASOPHILS # BLD AUTO: 0.07 X10*3/UL (ref 0–0.1)
BASOPHILS NFR BLD AUTO: 1.1 %
BASOPHILS NFR BLD AUTO: 1.3 %
BILIRUB SERPL-MCNC: 0.6 MG/DL (ref 0–1.2)
BUN SERPL-MCNC: 24 MG/DL (ref 6–23)
CALCIUM SERPL-MCNC: 9.1 MG/DL (ref 8.6–10.3)
CHLORIDE SERPL-SCNC: 107 MMOL/L (ref 98–107)
CO2 SERPL-SCNC: 23 MMOL/L (ref 21–32)
CREAT SERPL-MCNC: 1.93 MG/DL (ref 0.5–1.3)
EGFRCR SERPLBLD CKD-EPI 2021: 33 ML/MIN/1.73M*2
EOSINOPHIL # BLD AUTO: 0.08 X10*3/UL (ref 0–0.4)
EOSINOPHIL # BLD AUTO: 0.12 X10*3/UL (ref 0–0.4)
EOSINOPHIL NFR BLD AUTO: 1.5 %
EOSINOPHIL NFR BLD AUTO: 2.2 %
ERYTHROCYTE [DISTWIDTH] IN BLOOD BY AUTOMATED COUNT: 15.6 % (ref 11.5–14.5)
ERYTHROCYTE [DISTWIDTH] IN BLOOD BY AUTOMATED COUNT: 15.8 % (ref 11.5–14.5)
GLUCOSE SERPL-MCNC: 104 MG/DL (ref 74–99)
HCT VFR BLD AUTO: 38.8 % (ref 41–52)
HCT VFR BLD AUTO: 41.8 % (ref 41–52)
HGB BLD-MCNC: 11.8 G/DL (ref 13.5–17.5)
HGB BLD-MCNC: 13.1 G/DL (ref 13.5–17.5)
HOLD SPECIMEN: NORMAL
IMM GRANULOCYTES # BLD AUTO: 0.02 X10*3/UL (ref 0–0.5)
IMM GRANULOCYTES # BLD AUTO: 0.03 X10*3/UL (ref 0–0.5)
IMM GRANULOCYTES NFR BLD AUTO: 0.4 % (ref 0–0.9)
IMM GRANULOCYTES NFR BLD AUTO: 0.6 % (ref 0–0.9)
LYMPHOCYTES # BLD AUTO: 0.77 X10*3/UL (ref 0.8–3)
LYMPHOCYTES # BLD AUTO: 0.9 X10*3/UL (ref 0.8–3)
LYMPHOCYTES NFR BLD AUTO: 14.2 %
LYMPHOCYTES NFR BLD AUTO: 16.6 %
MAGNESIUM SERPL-MCNC: 2.11 MG/DL (ref 1.6–2.4)
MCH RBC QN AUTO: 26.9 PG (ref 26–34)
MCH RBC QN AUTO: 27.4 PG (ref 26–34)
MCHC RBC AUTO-ENTMCNC: 30.4 G/DL (ref 32–36)
MCHC RBC AUTO-ENTMCNC: 31.3 G/DL (ref 32–36)
MCV RBC AUTO: 87 FL (ref 80–100)
MCV RBC AUTO: 89 FL (ref 80–100)
MONOCYTES # BLD AUTO: 0.49 X10*3/UL (ref 0.05–0.8)
MONOCYTES # BLD AUTO: 0.57 X10*3/UL (ref 0.05–0.8)
MONOCYTES NFR BLD AUTO: 10.5 %
MONOCYTES NFR BLD AUTO: 9.1 %
NEUTROPHILS # BLD AUTO: 3.74 X10*3/UL (ref 1.6–5.5)
NEUTROPHILS # BLD AUTO: 3.97 X10*3/UL (ref 1.6–5.5)
NEUTROPHILS NFR BLD AUTO: 69.2 %
NEUTROPHILS NFR BLD AUTO: 73.3 %
NRBC BLD-RTO: 0 /100 WBCS (ref 0–0)
NRBC BLD-RTO: 0 /100 WBCS (ref 0–0)
PLATELET # BLD AUTO: 212 X10*3/UL (ref 150–450)
PLATELET # BLD AUTO: 241 X10*3/UL (ref 150–450)
POTASSIUM SERPL-SCNC: 4.1 MMOL/L (ref 3.5–5.3)
PROT SERPL-MCNC: 6.5 G/DL (ref 6.4–8.2)
RBC # BLD AUTO: 4.38 X10*6/UL (ref 4.5–5.9)
RBC # BLD AUTO: 4.78 X10*6/UL (ref 4.5–5.9)
SODIUM SERPL-SCNC: 137 MMOL/L (ref 136–145)
WBC # BLD AUTO: 5.4 X10*3/UL (ref 4.4–11.3)
WBC # BLD AUTO: 5.4 X10*3/UL (ref 4.4–11.3)

## 2025-07-23 PROCEDURE — 83735 ASSAY OF MAGNESIUM: CPT

## 2025-07-23 PROCEDURE — 2500000002 HC RX 250 W HCPCS SELF ADMINISTERED DRUGS (ALT 637 FOR MEDICARE OP, ALT 636 FOR OP/ED): Performed by: STUDENT IN AN ORGANIZED HEALTH CARE EDUCATION/TRAINING PROGRAM

## 2025-07-23 PROCEDURE — 36415 COLL VENOUS BLD VENIPUNCTURE: CPT

## 2025-07-23 PROCEDURE — 2500000002 HC RX 250 W HCPCS SELF ADMINISTERED DRUGS (ALT 637 FOR MEDICARE OP, ALT 636 FOR OP/ED)

## 2025-07-23 PROCEDURE — 2500000001 HC RX 250 WO HCPCS SELF ADMINISTERED DRUGS (ALT 637 FOR MEDICARE OP): Performed by: STUDENT IN AN ORGANIZED HEALTH CARE EDUCATION/TRAINING PROGRAM

## 2025-07-23 PROCEDURE — 97110 THERAPEUTIC EXERCISES: CPT | Mod: GP,CQ

## 2025-07-23 PROCEDURE — 2500000004 HC RX 250 GENERAL PHARMACY W/ HCPCS (ALT 636 FOR OP/ED)

## 2025-07-23 PROCEDURE — 97116 GAIT TRAINING THERAPY: CPT | Mod: GP,CQ

## 2025-07-23 PROCEDURE — 2500000004 HC RX 250 GENERAL PHARMACY W/ HCPCS (ALT 636 FOR OP/ED): Performed by: STUDENT IN AN ORGANIZED HEALTH CARE EDUCATION/TRAINING PROGRAM

## 2025-07-23 PROCEDURE — 99233 SBSQ HOSP IP/OBS HIGH 50: CPT

## 2025-07-23 PROCEDURE — 2500000001 HC RX 250 WO HCPCS SELF ADMINISTERED DRUGS (ALT 637 FOR MEDICARE OP): Performed by: INTERNAL MEDICINE

## 2025-07-23 PROCEDURE — 85025 COMPLETE CBC W/AUTO DIFF WBC: CPT

## 2025-07-23 PROCEDURE — 1200000002 HC GENERAL ROOM WITH TELEMETRY DAILY

## 2025-07-23 PROCEDURE — 80053 COMPREHEN METABOLIC PANEL: CPT

## 2025-07-23 PROCEDURE — 99024 POSTOP FOLLOW-UP VISIT: CPT | Performed by: NURSE PRACTITIONER

## 2025-07-23 RX ORDER — QUETIAPINE FUMARATE 25 MG/1
25 TABLET, FILM COATED ORAL NIGHTLY
Status: DISCONTINUED | OUTPATIENT
Start: 2025-07-23 | End: 2025-07-24 | Stop reason: HOSPADM

## 2025-07-23 RX ORDER — AMLODIPINE BESYLATE 5 MG/1
5 TABLET ORAL DAILY
Qty: 90 TABLET | Refills: 3 | Status: SHIPPED | OUTPATIENT
Start: 2025-07-23

## 2025-07-23 RX ADMIN — QUETIAPINE FUMARATE 25 MG: 25 TABLET ORAL at 18:31

## 2025-07-23 RX ADMIN — CEFTRIAXONE 1 G: 1 INJECTION, SOLUTION INTRAVENOUS at 16:30

## 2025-07-23 RX ADMIN — Medication 5 MG: at 19:57

## 2025-07-23 RX ADMIN — AMLODIPINE BESYLATE 5 MG: 5 TABLET ORAL at 09:14

## 2025-07-23 RX ADMIN — LISINOPRIL 40 MG: 40 TABLET ORAL at 09:14

## 2025-07-23 RX ADMIN — RIVAROXABAN 15 MG: 15 TABLET, FILM COATED ORAL at 16:29

## 2025-07-23 RX ADMIN — ACETAMINOPHEN 650 MG: 325 TABLET ORAL at 19:59

## 2025-07-23 RX ADMIN — POLYETHYLENE GLYCOL 3350 17 G: 17 POWDER, FOR SOLUTION ORAL at 09:14

## 2025-07-23 RX ADMIN — PANTOPRAZOLE SODIUM 40 MG: 40 TABLET, DELAYED RELEASE ORAL at 19:57

## 2025-07-23 RX ADMIN — SIMVASTATIN 40 MG: 40 TABLET, FILM COATED ORAL at 19:57

## 2025-07-23 ASSESSMENT — COGNITIVE AND FUNCTIONAL STATUS - GENERAL
DAILY ACTIVITIY SCORE: 20
HELP NEEDED FOR BATHING: A LITTLE
HELP NEEDED FOR BATHING: A LITTLE
DRESSING REGULAR UPPER BODY CLOTHING: A LITTLE
STANDING UP FROM CHAIR USING ARMS: A LITTLE
MOVING FROM LYING ON BACK TO SITTING ON SIDE OF FLAT BED WITH BEDRAILS: A LITTLE
STANDING UP FROM CHAIR USING ARMS: A LITTLE
MOBILITY SCORE: 17
WALKING IN HOSPITAL ROOM: A LITTLE
DRESSING REGULAR LOWER BODY CLOTHING: A LITTLE
MOVING TO AND FROM BED TO CHAIR: A LITTLE
TURNING FROM BACK TO SIDE WHILE IN FLAT BAD: A LITTLE
WALKING IN HOSPITAL ROOM: A LITTLE
DRESSING REGULAR LOWER BODY CLOTHING: A LITTLE
CLIMB 3 TO 5 STEPS WITH RAILING: A LOT
WALKING IN HOSPITAL ROOM: A LITTLE
MOVING TO AND FROM BED TO CHAIR: A LITTLE
MOVING FROM LYING ON BACK TO SITTING ON SIDE OF FLAT BED WITH BEDRAILS: A LITTLE
DAILY ACTIVITIY SCORE: 20
CLIMB 3 TO 5 STEPS WITH RAILING: A LOT
TOILETING: A LITTLE
CLIMB 3 TO 5 STEPS WITH RAILING: A LOT
STANDING UP FROM CHAIR USING ARMS: A LITTLE
MOBILITY SCORE: 17
MOVING TO AND FROM BED TO CHAIR: A LITTLE
TOILETING: A LITTLE
MOBILITY SCORE: 17
DRESSING REGULAR UPPER BODY CLOTHING: A LITTLE
TURNING FROM BACK TO SIDE WHILE IN FLAT BAD: A LITTLE
MOVING FROM LYING ON BACK TO SITTING ON SIDE OF FLAT BED WITH BEDRAILS: A LITTLE
TURNING FROM BACK TO SIDE WHILE IN FLAT BAD: A LITTLE

## 2025-07-23 ASSESSMENT — PAIN - FUNCTIONAL ASSESSMENT
PAIN_FUNCTIONAL_ASSESSMENT: 0-10

## 2025-07-23 ASSESSMENT — PAIN SCALES - GENERAL
PAINLEVEL_OUTOF10: 0 - NO PAIN
PAINLEVEL_OUTOF10: 2
PAINLEVEL_OUTOF10: 0 - NO PAIN

## 2025-07-23 ASSESSMENT — PAIN DESCRIPTION - DESCRIPTORS: DESCRIPTORS: ACHING

## 2025-07-23 ASSESSMENT — PAIN DESCRIPTION - LOCATION: LOCATION: HEAD

## 2025-07-23 NOTE — PROGRESS NOTES
Dash García is a 89 y.o. male on day 3 of admission presenting with No Principal Problem: There is no principal problem currently on the Problem List. Please update the Problem List and refresh..      Subjective   Agitated overnight and trying to leave hospital requiring Haldol and Benadryl injection.  This resolved the patient's symptoms.  This morning feels well and denies any complaints at this time.  Does not remember getting antsy overnight.  Left elbow remains swollen but less red    Objective     Last Recorded Vitals  /76 (BP Location: Right arm, Patient Position: Lying)   Pulse 65   Temp 36.3 °C (97.3 °F) (Temporal)   Resp 16   Wt 72.6 kg (160 lb)   SpO2 98%   Intake/Output last 3 Shifts:    Intake/Output Summary (Last 24 hours) at 7/23/2025 1629  Last data filed at 7/23/2025 1043  Gross per 24 hour   Intake 240 ml   Output --   Net 240 ml       Admission Weight  Weight: 72.6 kg (160 lb) (07/20/25 1352)    Daily Weight  07/20/25 : 72.6 kg (160 lb)    Image Results  MR elbow left wo IV contrast  Narrative: Interpreted By:  Monster Serna,  and Jona Zuñiga   STUDY:  MRI of the  left elbow  without IV contrast;  7/22/2025 10:28 pm      INDICATION:  Signs/Symptoms:left UE edema about the elbow.          COMPARISON:  X-ray 07/20/2025      ACCESSION NUMBER(S):  [id number]      ORDERING CLINICIAN:  JOSSE MATTA      TECHNIQUE:  MR imaging of the  left elbow was obtained  without IV contrast.      FINDINGS:  Multiple sequences are degraded by motion artifact.      TENDONS:  There is thickening and intermediate signal intensity of the triceps  brachii tendon at the olecranon insertion, representing tendinosis  without evidence of tear. Additionally, there is thickening and  increased signal of the common flexor and extensor origins. No  high-grade or full-thickness tear.      The major tendons of the elbow are intact, including the biceps,  triceps, and brachialis tendons as well as the  common extensor and  flexor tendons.      LIGAMENTS:  There is mild thickening and intermediate signal intensity of the  lateral ulnar collateral ligament which might represent ligamentous  sprain without tear. The major ligaments of the elbow are intact,  including the ulnar collateral, lateral ulnar collateral, and radial  collateral ligaments.      JOINTS:  There is no dislocation. The articular cartilage is intact. There is  no osteochondral defect.      There is a moderate sized elbow joint effusion.      OSSEOUS STRUCTURES:  The bone marrow signal is normal. There is no fracture or contusion.  There is no marrow replacing lesion.      SOFT TISSUES:  There is feathery T2 hyperintense signal within the musculature of  the left elbow most prominently involving the pronator teres and  brachialis muscles. There is diffuse moderate fatty atrophy of the  musculature There is soft tissue swelling most prominently in the  dorsal and anteromedial aspect of the elbow joint. There are findings  suggestive of representing olecranon bursitis with fluid signal  intensity overlying the olecranon process.      The ulnar nerve is normal. The cubital tunnel is intact.      Impression: Overall limited evaluation of the ligamentous structures and tendons  due to significant motion artifact in multiple sequences. Within the  limitations:  1. Moderate sized elbow joint effusion without evidence of underlying  fracture in the elbow joint. This is nonspecific and may be seen in  the setting of crystal arthropathy (gout) or infectious processes.  2. Soft tissue swelling in the dorsal and anteromedial aspect of the  elbow. Findings are nonspecific and be seen in the setting of  cellulitis. There are findings suggestive of olecranon bursitis of  indeterminate sterility.  3. Findings suggestive of moderate triceps brachii insertional  tendinosis. There is additional common flexor and extensor origin  tendinosis.  4. Findings suggestive of  mild lateral ulnar collateral ligament  sprain.  5. The muscular edema about the elbow is nonspecific and may reflect  myositis or be reactive in nature.      I personally reviewed the images/study and I agree with the findings  as stated. This study was interpreted at [facility], [place], [place].      MACRO:  None      Signed by: Monster Serna 7/23/2025 11:29 AM  Dictation workstation:   [id number]      Physical Exam  GENERAL: Alert and oriented x1. No acute distress. elderly  EYES: Anicteric. R pupil nonreactive (known chronic finding), L equal and reactive  HENT: Moist mucous membranes. No scleral icterus.   LUNGS: CTA BL. No accessory muscle use.  CV: RRR. No murmur. No JVD.  ABDOMEN: Soft, non-tender and non-distended. No palpable masses. BS+ x4  EXTREMITIES: No edema. Non-tender.?L elbow significantly less swollen, erythematous and no pain with passive motion today.  SKIN: No rashes or lesions. Warm.  NEUROLOGIC: No focal neurological deficits.  PSYCHIATRIC: Cooperative.     Relevant Results  Results for orders placed or performed during the hospital encounter of 07/20/25 (from the past 24 hours)   Magnesium   Result Value Ref Range    Magnesium 2.11 1.60 - 2.40 mg/dL   Comprehensive Metabolic Panel   Result Value Ref Range    Glucose 104 (H) 74 - 99 mg/dL    Sodium 137 136 - 145 mmol/L    Potassium 4.1 3.5 - 5.3 mmol/L    Chloride 107 98 - 107 mmol/L    Bicarbonate 23 21 - 32 mmol/L    Anion Gap 11 10 - 20 mmol/L    Urea Nitrogen 24 (H) 6 - 23 mg/dL    Creatinine 1.93 (H) 0.50 - 1.30 mg/dL    eGFR 33 (L) >60 mL/min/1.73m*2    Calcium 9.1 8.6 - 10.3 mg/dL    Albumin 3.2 (L) 3.4 - 5.0 g/dL    Alkaline Phosphatase 50 33 - 136 U/L    Total Protein 6.5 6.4 - 8.2 g/dL    AST 30 9 - 39 U/L    Bilirubin, Total 0.6 0.0 - 1.2 mg/dL    ALT 7 (L) 10 - 52 U/L   Lavender Top   Result Value Ref Range    Extra Tube Hold for add-ons.    CBC and Auto Differential   Result Value Ref Range     WBC 5.4 4.4 - 11.3 x10*3/uL    nRBC 0.0 0.0 - 0.0 /100 WBCs    RBC 4.78 4.50 - 5.90 x10*6/uL    Hemoglobin 13.1 (L) 13.5 - 17.5 g/dL    Hematocrit 41.8 41.0 - 52.0 %    MCV 87 80 - 100 fL    MCH 27.4 26.0 - 34.0 pg    MCHC 31.3 (L) 32.0 - 36.0 g/dL    RDW 15.6 (H) 11.5 - 14.5 %    Platelets 241 150 - 450 x10*3/uL    Neutrophils % 73.3 40.0 - 80.0 %    Immature Granulocytes %, Automated 0.6 0.0 - 0.9 %    Lymphocytes % 14.2 13.0 - 44.0 %    Monocytes % 9.1 2.0 - 10.0 %    Eosinophils % 1.5 0.0 - 6.0 %    Basophils % 1.3 0.0 - 2.0 %    Neutrophils Absolute 3.97 1.60 - 5.50 x10*3/uL    Immature Granulocytes Absolute, Automated 0.03 0.00 - 0.50 x10*3/uL    Lymphocytes Absolute 0.77 (L) 0.80 - 3.00 x10*3/uL    Monocytes Absolute 0.49 0.05 - 0.80 x10*3/uL    Eosinophils Absolute 0.08 0.00 - 0.40 x10*3/uL    Basophils Absolute 0.07 0.00 - 0.10 x10*3/uL               Assessment & Plan    Mr. Dash García is a 89yoM [employer]  w/ PMHx of HTN, afib on Xarelto, HLD, unspecified dementia (Aox1 is baseline) who presented for generalized weakness and L elbow swelling, pain, tenderness and overlying erythema worsening over several days. Admitted for c/f cellulitis in setting of L olecranon bursitis.     #Cellulitis in setting of L olecranon bursitis  #L elbow joint effusion  #Hypokalemia  -Dx includes septic arthritis, gout, infectious arthritis such as viral, RA   - Ortho consulted, did perform bedside joint aspiration, sterile culture pending. Observing overnight on IV abx.  Recommend MRI to rule out abscess, consistent with joint effusion and overlying cellulitis, ?myositis  -ID consulted and case discussed with consultant via Aim[employer] chat. vanc and zosyn DC, will continue rocephin.  Continue on IV antibiotics, likely transition to Augmentin at DC  -ESR 41, CRP 18  -uric acid and RA wnl  -Daily CBC  -FU sterile culture, unfortunately not enough fluid for gram stain or crystals  -PRN tylenol  -CK pending     HTN  afib  on Xarelto  HLD  CKD3  unspecified dementia (Aox1 is baseline) c/b agitation at night  -Daily RFP and Mag, goal K>4 and Mg>2  -resume xarelto today  -cont home meds without changes     IVF: not indicated  Tele: indicated  Consults: ortho surg, ID  FEN/GI: regular  DVT ppx: lovenox, SCDs  Access: PIV   Code status: assumed full, wife is not answering phone despite multiple voicemails     Dispo: Likely tomorrow to home with [facility]HC pending abx choice and aspiration results     Complexity: high    Fadumo Lisa, DO

## 2025-07-23 NOTE — ASSESSMENT & PLAN NOTE
-Aspiration done 07/21, negative gram stain, culture: NGTD on prelim, will follow  -MRI left elbow pending  -Continue Rocephin as recommended by ID service

## 2025-07-23 NOTE — NURSING NOTE
7/22/2025 1930- Patient continues to attempt to leave the unit and was observed putting his clothes on. Patient very unsteady on feet with poor safety awareness. Patient is Alert to self only. When this nurse attempted to redirect him, the patient became increasingly agitated, slamming his fists on the table. Redirection using therapeutic communication techniques was unsuccessful. The patient remained agitated and unable to be calmed. The hospitalist was notified, and orders were  received for medication and a sitter. Charge nurse and security were at bedside for medication administration. Sitter is now at the bedside for patient safety.

## 2025-07-23 NOTE — SIGNIFICANT EVENT

## 2025-07-23 NOTE — PROGRESS NOTES
Dash García is a 89 y.o. male on day 3 of admission presenting with No Principal Problem: There is no principal problem currently on the Problem List. Please update the Problem List and refresh..    Subjective   Mr. García was in in bed, awake, in NAD. He c/o minimal pain in the left elbow, which is exacerbated by end flexion and extension.       Objective     Physical Exam  Constitutional:       Appearance: Normal appearance.   HENT:      Head: Normocephalic.      Nose: Nose normal.      Mouth/Throat:      Mouth: Mucous membranes are moist.     Eyes:      Extraocular Movements: Extraocular movements intact.      Pupils: Pupils are equal, round, and reactive to light.       Cardiovascular:      Rate and Rhythm: Normal rate and regular rhythm.      Pulses: Normal pulses.      Heart sounds: Normal heart sounds.   Pulmonary:      Effort: Pulmonary effort is normal.      Breath sounds: Normal breath sounds.   Abdominal:      General: Abdomen is flat.      Palpations: Abdomen is soft.     Musculoskeletal:         General: Normal range of motion.      Cervical back: Normal range of motion and neck supple.      Comments: Left elbow with  boggy edema and erythema extending proximally and distally from elbow. It did not spread outside of the outlined region from 07/22. Minimal elbow pain on end flexion and end extension. Sensation intact left UE, radial pulse palpable 2+/2      Skin:     General: Skin is warm and dry.      Capillary Refill: Capillary refill takes less than 2 seconds.     Neurological:      General: No focal deficit present.      Mental Status: He is alert and oriented to person, place, and time.     Psychiatric:         Behavior: Behavior normal.         Last Recorded Vitals  Blood pressure 142/74, pulse (!) 48, temperature 36.2 °C (97.2 °F), temperature source Temporal, resp. rate 16, height 1.829 m (6'), weight 72.6 kg (160 lb), SpO2 97%.  Intake/Output last 3 Shifts:  I/O last 3 completed  shifts:  In: 900 (12.4 mL/kg) [P.O.:800; I.V.:50 (0.7 mL/kg); IV Piggyback:50]  Out: - (0 mL/kg)   Weight: 72.6 kg     Relevant Results      Scheduled medications  Scheduled Medications[1]  Continuous medications  Continuous Medications[2]  PRN medications  PRN Medications[3]  Results for orders placed or performed during the hospital encounter of 07/20/25 (from the past 24 hours)   MRSA Surveillance for Vancomycin De-escalation, PCR    Specimen: Anterior Nares; Swab   Result Value Ref Range    MRSA PCR Not Detected Not Detected   CBC and Auto Differential   Result Value Ref Range    WBC 5.6 4.4 - 11.3 x10*3/uL    nRBC 0.0 0.0 - 0.0 /100 WBCs    RBC 4.80 4.50 - 5.90 x10*6/uL    Hemoglobin 12.9 (L) 13.5 - 17.5 g/dL    Hematocrit 41.7 41.0 - 52.0 %    MCV 87 80 - 100 fL    MCH 26.9 26.0 - 34.0 pg    MCHC 30.9 (L) 32.0 - 36.0 g/dL    RDW 15.5 (H) 11.5 - 14.5 %    Platelets 199 150 - 450 x10*3/uL    Neutrophils % 76.9 40.0 - 80.0 %    Immature Granulocytes %, Automated 0.2 0.0 - 0.9 %    Lymphocytes % 12.3 13.0 - 44.0 %    Monocytes % 8.2 2.0 - 10.0 %    Eosinophils % 1.2 0.0 - 6.0 %    Basophils % 1.2 0.0 - 2.0 %    Neutrophils Absolute 4.33 1.60 - 5.50 x10*3/uL    Immature Granulocytes Absolute, Automated 0.01 0.00 - 0.50 x10*3/uL    Lymphocytes Absolute 0.69 (L) 0.80 - 3.00 x10*3/uL    Monocytes Absolute 0.46 0.05 - 0.80 x10*3/uL    Eosinophils Absolute 0.07 0.00 - 0.40 x10*3/uL    Basophils Absolute 0.07 0.00 - 0.10 x10*3/uL   Magnesium   Result Value Ref Range    Magnesium 2.00 1.60 - 2.40 mg/dL   Comprehensive Metabolic Panel   Result Value Ref Range    Glucose 138 (H) 74 - 99 mg/dL    Sodium 137 136 - 145 mmol/L    Potassium 3.2 (L) 3.5 - 5.3 mmol/L    Chloride 104 98 - 107 mmol/L    Bicarbonate 23 21 - 32 mmol/L    Anion Gap 13 10 - 20 mmol/L    Urea Nitrogen 21 6 - 23 mg/dL    Creatinine 1.64 (H) 0.50 - 1.30 mg/dL    eGFR 40 (L) >60 mL/min/1.73m*2    Calcium 9.3 8.6 - 10.3 mg/dL    Albumin 3.5 3.4 - 5.0 g/dL     Alkaline Phosphatase 65 33 - 136 U/L    Total Protein 6.8 6.4 - 8.2 g/dL    AST 9 9 - 39 U/L    Bilirubin, Total 0.8 0.0 - 1.2 mg/dL    ALT 4 (L) 10 - 52 U/L   Phosphorus   Result Value Ref Range    Phosphorus 2.8 2.5 - 4.9 mg/dL   Magnesium   Result Value Ref Range    Magnesium 2.11 1.60 - 2.40 mg/dL   Comprehensive Metabolic Panel   Result Value Ref Range    Glucose 104 (H) 74 - 99 mg/dL    Sodium 137 136 - 145 mmol/L    Potassium 4.1 3.5 - 5.3 mmol/L    Chloride 107 98 - 107 mmol/L    Bicarbonate 23 21 - 32 mmol/L    Anion Gap 11 10 - 20 mmol/L    Urea Nitrogen 24 (H) 6 - 23 mg/dL    Creatinine 1.93 (H) 0.50 - 1.30 mg/dL    eGFR 33 (L) >60 mL/min/1.73m*2    Calcium 9.1 8.6 - 10.3 mg/dL    Albumin 3.2 (L) 3.4 - 5.0 g/dL    Alkaline Phosphatase 50 33 - 136 U/L    Total Protein 6.5 6.4 - 8.2 g/dL    AST 30 9 - 39 U/L    Bilirubin, Total 0.6 0.0 - 1.2 mg/dL    ALT 7 (L) 10 - 52 U/L   Lavender Top   Result Value Ref Range    Extra Tube Hold for add-ons.          Assessment & Plan  Benign essential hypertension    Dyslipidemia    Paroxysmal A-fib (Multi)    Cellulitis of left elbow    Joint effusion of elbow, left  -Aspiration done 07/21, negative gram stain, culture: NGTD on prelim, will follow  -MRI left elbow pending  -Continue Rocephin as recommended by ID service        I spent 25 minutes in the professional and overall care of this patient.      Nelsy Webb, APRN-CNP           [1] amLODIPine, 5 mg, oral, Daily  cefTRIAXone, 1 g, intravenous, q24h  lisinopril, 40 mg, oral, Daily  pantoprazole, 40 mg, oral, q PM  polyethylene glycol, 17 g, oral, Daily  rivaroxaban, 15 mg, oral, Daily with evening meal  simvastatin, 40 mg, oral, Nightly    [2]    [3] PRN medications: acetaminophen **OR** acetaminophen **OR** acetaminophen, melatonin

## 2025-07-23 NOTE — CARE PLAN
The patient's goals for the shift include      The clinical goals for the shift include see care plan      Problem: Pain - Adult  Goal: Verbalizes/displays adequate comfort level or baseline comfort level  Outcome: Progressing     Problem: Safety - Adult  Goal: Free from fall injury  Outcome: Progressing

## 2025-07-23 NOTE — PROGRESS NOTES
Spiritual Care Visit  Spiritual Care Request    Reason for Visit:  Routine Visit: Introduction     Request Received From:       Focus of Care:  Visited With: Patient         Refer to :          Spiritual Care Assessment    Spiritual Assessment:                      Care Provided:  Intended Effects: Promote sense of peace, Preserve dignity and respect, Meaning-making  Methods: Offer spiritual/Worship support  Interventions: Share words of hope and inspiration, Millis    Sense of Community and or Sh[employer] Affiliation:  [employer]         Addressed Needs/Concerns and/or Surya Through:          Outcome:        Advance Directives:         Spiritual Care Annotation    Annotation:   was a supportive presence and prayed

## 2025-07-23 NOTE — PROGRESS NOTES
INPATIENT PROGRESS NOTES    PATIENT NAME: Dash García    MRN: [id number]  SERVICE DATE:  7/23/2025   SERVICE TIME:  10:08 AM      SUBJECTIVE  Afebrile  No events overnight   MRI left elbow pending  Patient seen and examined this morning, resting comfortably in bed. No acute complaints at this time. Denies elbow pain.       OBJECTIVE  PHYSICAL EXAM:   Patient Vitals for the past 24 hrs:   BP Temp Temp src Pulse Resp SpO2   07/23/25 0800 142/74 36.2 °C (97.2 °F) Temporal (!) 48 16 97 %   07/23/25 0000 144/78 36 °C (96.8 °F) Temporal 70 18 98 %   07/22/25 2000 131/68 36.2 °C (97.2 °F) -- 104 20 97 %   07/22/25 1600 149/84 36.2 °C (97.2 °F) Temporal 77 20 100 %       General: Not in acute distress, A&O x3, alert, coopertive, well-developed  HEENT: Normocephalic, atraumatic, EOMI, moist mucous membranes  Neck: Neck supple, trachea midline, no evidence of trauma  Cardiovascular: RRR, S1 and S2 appreciated, no murmurs rubs gallops appreciated  Respiratory: Clear to auscultation bilaterally without wheezes, rales, rhonchi; no increased work of breathing noted  GI: Abdomen soft, nondistended, nontender to palpation, bowel sounds present  : No munoz present  Extremities: No edema appreciated in lower extremities bilaterally, no cyanosis  MSK: Left elbow with improved swelling, no warmth to the touch. Erythema noted, worse on the medial aspect, not extending past outline. Some pain with full flexion and extension.   Neuro: A&O x3, no focal deficits, strength and sensation intact bilaterally  Skin: Warm and dry, without lesions or rashes      Labs:  Lab Results   Component Value Date    WBC 5.6 07/22/2025    HGB 12.9 (L) 07/22/2025    HCT 41.7 07/22/2025    MCV 87 07/22/2025     07/22/2025     Lab Results   Component Value Date    GLUCOSE 104 (H) 07/23/2025    CALCIUM 9.1 07/23/2025     07/23/2025    K 4.1 07/23/2025    CO2 23 07/23/2025     07/23/2025    BUN 24 (H) 07/23/2025    CREATININE 1.93 (H)  07/23/2025   ESR: --  Lab Results   Component Value Date    SEDRATE 41 (H) 07/21/2025     Lab Results   Component Value Date    CRP 18.17 (H) 07/21/2025     Lab Results   Component Value Date    ALT 7 (L) 07/23/2025    AST 30 07/23/2025    ALKPHOS 50 07/23/2025    BILITOT 0.6 07/23/2025         DATA:   Diagnostic tests reviewed for today's visit:    Labs this admission reviewed  Imagings this admission reviewed  Cultures: Reviewed  -Blood cultures x2 7/20: No growth at two days  -Joint aspiration 7/21: no organisms seen; not enough fluid collected for gram stain and crystals  -MRSA PCR negative      ASSESSMENT :   -Left elbow erythema and swelling, c/f cellulitis and bursitis, possible joint involvement  -S/p left elbow joint aspiration 7/21 w/ orthopedic surgery  -H/o atrial fibrillation, HTN, HLD    PLAN:  -Continue ceftriaxone while inpatient, can transition to Augmentin for a total of 14 days upon discharge  -Closely monitor for antibiotics side effects including rash, Diarrhea/CDI, thrombocytopenia, XOCHITL, etc.  -ID will sign off at this time, feel free to contact for any additional questions    Patient seen and discussed with attending physician    Catarina Rodríguez DO  Internal Medicine PGY-2 Resident

## 2025-07-23 NOTE — CARE PLAN
EOS- No acute changes throughout the shift. Sitter able to be discontinued this morning. IV antibiotics continued. Patient denies any pain or shortness of breath. Patient up to the chair this morning and afternoon. Wife at bedside this afternoon. Safety maintained and call light within reach.    1800- Patient is starting to become more agitated and impulsive. MD notified.

## 2025-07-23 NOTE — PROGRESS NOTES
Occupational Therapy                 Therapy Communication Note    Patient Name: Dash García  MRN: [id number]  Department: [facility] 3 N  Room: formerly [facility]3025-A  Today's Date: 7/23/2025     Discipline: Occupational Therapy    Missed Visit: OT Missed Visit: Yes     Missed Visit Reason: Missed Visit Reason: Pt up in chair, pleasantly deferred and requesting to eat his meal tray at this time, will follow up as time permits and pt is available for therapy.     Missed Time: Attempt    Comment:

## 2025-07-23 NOTE — PROGRESS NOTES
Physical Therapy    Physical Therapy Treatment    Patient Name: Dash García  MRN: [id number]  Today's Date: 7/23/2025  Time Calculation  Start Time: 1240  Stop Time: 1320  Time Calculation (min): 40 min     3025/3025-A       07/23/25 1240   PT  Visit   PT Received On 07/23/25   Response to Previous Treatment Patient with no complaints from previous session.   General   Reason for Referral P/w with generalized weakness and new swelling of the left elbow.  Per his wife the patient was unable to stand from seated position at home today, which is a change from baseline.  Swelling and redness of the left elbow were also noted today. No trauma. X-ray of the left elbow was done showing suspected left elbow joint effusion. Pt now s/p bedside aspiration of L elbow by Dr. Owens 7/20.   Referred By Dr. Lisa   Past Medical History Relevant to Rehab A-fib (on Xarelto), HTN, RBBB, GERD, TIIDM, Dementia   Family/Caregiver Present No   Prior to Session Communication Bedside nurse   Patient Position Received Bed, 2 rail up;Alarm on   Preferred Learning Style verbal   General Comment Pt is pleasantly and confused, agreeable to therapy and cleared for participation   Precautions   Hearing/Visual Limitations Glasses   Medical Precautions Fall precautions   Pain Assessment   Pain Assessment 0-10   0-10 (Numeric) Pain Score 0 - No pain   Cognition   Overall Cognitive Status WFL   Orientation Level Disoriented to situation   Memory X   Safety/Judgement X   Impulsive Mildly   Processing Speed Delayed   Coordination   Movements are Fluid and Coordinated Yes   Postural Control   Postural Control WFL   Static Sitting Balance   Static Sitting-Balance Support Bilateral upper extremity supported;Feet supported   Static Sitting-Level of Assistance Distant supervision   Dynamic Sitting Balance   Dynamic Sitting-Balance Support Bilateral upper extremity supported   Dynamic Sitting-Level of Assistance Close supervision  (Therex at EOB.)    Static Standing Balance   Static Standing-Balance Support Bilateral upper extremity supported   Static Standing-Level of Assistance Contact guard   Therapeutic Exercise   Therapeutic Exercise Performed Yes   Therapeutic Exercise Activity 1 Feet [id number]   Therapeutic Exercise Activity 2 [id number]   Therapeutic Exercise Activity 3 Pillow [id number]   Therapeutic Exercise Activity 4 Resisted Hip [id number]   Therapeutic Exercise Activity 5 [id number]   Therapeutic Exercise Activity 6 Heel [id number]   Bed Mobility   Bed Mobility Yes   Bed Mobility 1   Bed Mobility 1 Supine to sitting   Level of Assistance 1 Close supervision   Ambulation/Gait Training   Ambulation/Gait Training Performed Yes   Ambulation/Gait Training 1   Surface 1 Level tile   Device 1 No device   Gait Support Devices Gait belt   Assistance 1 Contact guard;Minimal verbal cues   Quality of Gait 1 Diminished heel strike;WBOS;Forward flexed posture   Comments/Distance (ft) 1 250'   Transfers   Transfer Yes   Transfer 1   Transfer From 1 Bed to   Transfer to 1 Stand   Technique 1 Sit to stand   Transfer Device 1 Gait belt   Transfer Level of Assistance 1 Contact guard;Minimum assistance   Transfers 2   Transfer From 2 Stand to   Transfer to 2 Chair with arms   Technique 2 Stand to sit   Transfer Device 2 Gait belt   Transfer Level of Assistance 2 Contact guard;Minimal verbal cues   Activity Tolerance   Endurance Tolerates 30 min exercise with multiple rests   PT Assessment   PT Assessment Results Decreased strength;Decreased range of motion;Decreased endurance;Impaired balance;Decreased mobility;Decreased safety awareness;Decreased cognition;Impaired judgement   Rehab Prognosis Good   Evaluation/Treatment Tolerance Patient tolerated treatment well   Medical Staff Made Aware Yes   End of Session Communication Bedside nurse   Assessment Comment Pt is pleasantly confused and puts forth good effort. Mild unstable with ambulation.   End of Session Patient Position  Up in chair;Alarm on   Outpatient Education   Individual(s) Educated Patient   Education Provided Body Mechanics;Fall Risk;POC   Risk and Benefits Discussed with Patient/Caregiver/Other yes   Patient/Caregiver Demonstrated Understanding yes   Plan of Care Discussed and Agreed Upon yes   Patient Response to Education Patient/Caregiver Verbalized Understanding of Information   PT Plan   Inpatient/Swing Bed or Outpatient Inpatient   PT Plan   Treatment/Interventions Bed mobility;Transfer training;Gait training;Therapeutic exercise   PT Plan Ongoing PT   PT Frequency 4 times per week   PT Discharge Recommendations Low intensity level of continued care;24 hr supervision due to cognition   Equipment Recommended upon Discharge Wheeled walker   PT Recommended Transfer Status Contact guard;Assistive device         Outcome Measures:  [facility] Basic Mobility  Turning from your back to your side while in a flat bed without using bedrails: A little  Moving from lying on your back to sitting on the side of a flat bed without using bedrails: A little  Moving to and from bed to chair (including a wheelchair): A little  Standing up from a chair using your arms (e.g. wheelchair or bedside chair): A little  To walk in hospital room: A little  Climbing 3-5 steps with railing: A lot  Basic Mobility - Total Score: 17        EDUCATION:  Outpatient Education  Individual(s) Educated: Patient  Education Provided: Body Mechanics, Fall Risk, POC  Risk and Benefits Discussed with Patient/Caregiver/Other: yes  Patient/Caregiver Demonstrated Understanding: yes  Plan of Care Discussed and Agreed Upon: yes  Patient Response to Education: Patient/Caregiver Verbalized Understanding of Information      GOALS:  Encounter Problems       Encounter Problems (Active)       PT Problem       Pt will be able to complete all bed mobility tasks (rolling, sit <> sup) mod I.          Start:  07/21/25    Expected End:  08/04/25            Pt will be able to complete  all transfers with LRD SBA demonstrating good safety awareness and proper body mechanics.        Start:  07/21/25    Expected End:  08/04/25            Pt will be able to ambulate >/= 100 ft with LRD SBA with good safety awareness and stability.        Start:  07/21/25    Expected End:  08/04/25            Pt will complete supine, seated and standing exercises to maintain/improve overall strength with minimal verbal cues.         Start:  07/21/25    Expected End:  08/04/25                 I personally have reviewed data entered by the student into the flowsheet and agree with this treatment session of this patient.    Zhou Vázquez, PTA

## 2025-07-24 ENCOUNTER — PHARMACY VISIT (OUTPATIENT)
Dept: PHARMACY | Facility: CLINIC | Age: 89
End: 2025-07-24
Payer: COMMERCIAL

## 2025-07-24 ENCOUNTER — APPOINTMENT (OUTPATIENT)
Facility: CLINIC | Age: 89
End: 2025-07-24
Payer: MEDICARE

## 2025-07-24 ENCOUNTER — DOCUMENTATION (OUTPATIENT)
Dept: HOME HEALTH SERVICES | Facility: HOME HEALTH | Age: 89
End: 2025-07-24
Payer: MEDICARE

## 2025-07-24 VITALS
SYSTOLIC BLOOD PRESSURE: 142 MMHG | TEMPERATURE: 97.5 F | DIASTOLIC BLOOD PRESSURE: 74 MMHG | HEIGHT: 72 IN | BODY MASS INDEX: 21.67 KG/M2 | RESPIRATION RATE: 18 BRPM | OXYGEN SATURATION: 98 % | WEIGHT: 160 LBS | HEART RATE: 70 BPM

## 2025-07-24 LAB
ALBUMIN SERPL BCP-MCNC: 3.6 G/DL (ref 3.4–5)
ALP SERPL-CCNC: 56 U/L (ref 33–136)
ALT SERPL W P-5'-P-CCNC: 7 U/L (ref 10–52)
ANION GAP SERPL CALC-SCNC: 13 MMOL/L (ref 10–20)
AST SERPL W P-5'-P-CCNC: 13 U/L (ref 9–39)
BACTERIA FLD CULT: NORMAL
BILIRUB SERPL-MCNC: 0.5 MG/DL (ref 0–1.2)
BUN SERPL-MCNC: 23 MG/DL (ref 6–23)
CALCIUM SERPL-MCNC: 9.4 MG/DL (ref 8.6–10.3)
CHLORIDE SERPL-SCNC: 108 MMOL/L (ref 98–107)
CK SERPL-CCNC: 52 U/L (ref 0–325)
CO2 SERPL-SCNC: 24 MMOL/L (ref 21–32)
CREAT SERPL-MCNC: 1.82 MG/DL (ref 0.5–1.3)
EGFRCR SERPLBLD CKD-EPI 2021: 35 ML/MIN/1.73M*2
GLUCOSE SERPL-MCNC: 111 MG/DL (ref 74–99)
GRAM STN SPEC: NORMAL
GRAM STN SPEC: NORMAL
HOLD SPECIMEN: NORMAL
MAGNESIUM SERPL-MCNC: 2.05 MG/DL (ref 1.6–2.4)
POTASSIUM SERPL-SCNC: 3.5 MMOL/L (ref 3.5–5.3)
PROT SERPL-MCNC: 6.6 G/DL (ref 6.4–8.2)
SODIUM SERPL-SCNC: 141 MMOL/L (ref 136–145)

## 2025-07-24 PROCEDURE — 83735 ASSAY OF MAGNESIUM: CPT

## 2025-07-24 PROCEDURE — 99239 HOSP IP/OBS DSCHRG MGMT >30: CPT

## 2025-07-24 PROCEDURE — RXMED WILLOW AMBULATORY MEDICATION CHARGE

## 2025-07-24 PROCEDURE — 82550 ASSAY OF CK (CPK): CPT

## 2025-07-24 PROCEDURE — 80053 COMPREHEN METABOLIC PANEL: CPT

## 2025-07-24 PROCEDURE — 36415 COLL VENOUS BLD VENIPUNCTURE: CPT

## 2025-07-24 PROCEDURE — 97535 SELF CARE MNGMENT TRAINING: CPT | Mod: GO,CO

## 2025-07-24 PROCEDURE — 2500000001 HC RX 250 WO HCPCS SELF ADMINISTERED DRUGS (ALT 637 FOR MEDICARE OP): Performed by: STUDENT IN AN ORGANIZED HEALTH CARE EDUCATION/TRAINING PROGRAM

## 2025-07-24 PROCEDURE — 2500000004 HC RX 250 GENERAL PHARMACY W/ HCPCS (ALT 636 FOR OP/ED): Performed by: STUDENT IN AN ORGANIZED HEALTH CARE EDUCATION/TRAINING PROGRAM

## 2025-07-24 RX ORDER — AMOXICILLIN AND CLAVULANATE POTASSIUM 500; 125 MG/1; MG/1
1 TABLET, FILM COATED ORAL 2 TIMES DAILY
Qty: 20 TABLET | Refills: 0 | Status: SHIPPED | OUTPATIENT
Start: 2025-07-24 | End: 2025-08-03

## 2025-07-24 RX ADMIN — POLYETHYLENE GLYCOL 3350 17 G: 17 POWDER, FOR SOLUTION ORAL at 09:27

## 2025-07-24 RX ADMIN — LISINOPRIL 40 MG: 40 TABLET ORAL at 09:27

## 2025-07-24 RX ADMIN — AMLODIPINE BESYLATE 5 MG: 5 TABLET ORAL at 09:28

## 2025-07-24 ASSESSMENT — COGNITIVE AND FUNCTIONAL STATUS - GENERAL
DRESSING REGULAR LOWER BODY CLOTHING: A LITTLE
DAILY ACTIVITIY SCORE: 18
HELP NEEDED FOR BATHING: A LITTLE
DRESSING REGULAR UPPER BODY CLOTHING: A LITTLE
DAILY ACTIVITIY SCORE: 20
MOVING TO AND FROM BED TO CHAIR: A LITTLE
TOILETING: A LITTLE
MOBILITY SCORE: 17
TOILETING: A LITTLE
WALKING IN HOSPITAL ROOM: A LITTLE
PERSONAL GROOMING: A LITTLE
TURNING FROM BACK TO SIDE WHILE IN FLAT BAD: A LITTLE
EATING MEALS: A LITTLE
DRESSING REGULAR UPPER BODY CLOTHING: A LITTLE
DRESSING REGULAR LOWER BODY CLOTHING: A LITTLE
HELP NEEDED FOR BATHING: A LITTLE
CLIMB 3 TO 5 STEPS WITH RAILING: A LOT
STANDING UP FROM CHAIR USING ARMS: A LITTLE
MOVING FROM LYING ON BACK TO SITTING ON SIDE OF FLAT BED WITH BEDRAILS: A LITTLE

## 2025-07-24 ASSESSMENT — PAIN - FUNCTIONAL ASSESSMENT
PAIN_FUNCTIONAL_ASSESSMENT: 0-10
PAIN_FUNCTIONAL_ASSESSMENT: 0-10

## 2025-07-24 ASSESSMENT — PAIN SCALES - GENERAL
PAINLEVEL_OUTOF10: 0 - NO PAIN
PAINLEVEL_OUTOF10: 0 - NO PAIN

## 2025-07-24 ASSESSMENT — ACTIVITIES OF DAILY LIVING (ADL): HOME_MANAGEMENT_TIME_ENTRY: 40

## 2025-07-24 NOTE — CARE PLAN
Problem: Pain - Adult  Goal: Verbalizes/displays adequate comfort level or baseline comfort level  Outcome: Progressing     Problem: Safety - Adult  Goal: Free from fall injury  Outcome: Progressing     Problem: Discharge Planning  Goal: Discharge to home or other facility with appropriate resources  Outcome: Progressing     Problem: Skin  Goal: Decreased wound size/increased tissue granulation at next dressing change  Outcome: Progressing  Flowsheets (Taken 7/24/2025 1036)  Decreased wound size/increased tissue granulation at next dressing change: Protective dressings over bony prominences     Problem: Skin  Goal: Participates in plan/prevention/treatment measures  Outcome: Progressing  Flowsheets (Taken 7/24/2025 1036)  Participates in plan/prevention/treatment measures: Elevate heels     Problem: Skin  Goal: Prevent/minimize sheer/friction injuries  Outcome: Progressing  Flowsheets (Taken 7/24/2025 1036)  Prevent/minimize sheer/friction injuries: Turn/reposition every 2 hours/use positioning/transfer devices     Problem: Fall/Injury  Goal: Not fall by end of shift  Outcome: Progressing     Problem: Fall/Injury  Goal: Be free from injury by end of the shift  Outcome: Progressing   The patient's goals for the shift include comfort/safety    The clinical goals for the shift include see POC

## 2025-07-24 NOTE — PROGRESS NOTES
Dash García is a 89 y.o. male on day 4 of admission presenting with No Principal Problem: There is no principal problem currently on the Problem List. Please update the Problem List and refresh..    Subjective   Patient states overall he feels well, no further pain in left elbow.  I reviewed results of cultures with patient and the fact that there was nothing growing.  Patient verbalized understanding.       Objective     Physical Exam  Constitutional:       Appearance: Normal appearance.   HENT:      Head: Normocephalic and atraumatic.      Nose: Nose normal.      Mouth/Throat:      Mouth: Mucous membranes are moist.     Cardiovascular:      Rate and Rhythm: Normal rate.   Pulmonary:      Effort: Pulmonary effort is normal.   Abdominal:      General: Abdomen is flat.      Palpations: Abdomen is soft.     Musculoskeletal:      Cervical back: Neck supple.      Comments: Left elbow with mild swelling present, no tenderness to palpation, no pain with movement of elbow joint, distal thoroughly sensation is present throughout, radial pulses are palpable     Skin:     General: Skin is warm and dry.     Neurological:      General: No focal deficit present.      Mental Status: He is alert.     Psychiatric:         Mood and Affect: Mood normal.         Last Recorded Vitals  Blood pressure 153/86, pulse 61, temperature 36.1 °C (97 °F), temperature source Temporal, resp. rate 18, height 1.829 m (6'), weight 72.6 kg (160 lb), SpO2 98%.  Intake/Output last 3 Shifts:  I/O last 3 completed shifts:  In: 1130 (15.6 mL/kg) [P.O.:1080; IV Piggyback:50]  Out: - (0 mL/kg)   Weight: 72.6 kg     Relevant Results  Results for orders placed or performed during the hospital encounter of 07/20/25 (from the past 24 hours)   CBC and Auto Differential   Result Value Ref Range    WBC 5.4 4.4 - 11.3 x10*3/uL    nRBC 0.0 0.0 - 0.0 /100 WBCs    RBC 4.78 4.50 - 5.90 x10*6/uL    Hemoglobin 13.1 (L) 13.5 - 17.5 g/dL    Hematocrit 41.8 41.0 - 52.0  %    MCV 87 80 - 100 fL    MCH 27.4 26.0 - 34.0 pg    MCHC 31.3 (L) 32.0 - 36.0 g/dL    RDW 15.6 (H) 11.5 - 14.5 %    Platelets 241 150 - 450 x10*3/uL    Neutrophils % 73.3 40.0 - 80.0 %    Immature Granulocytes %, Automated 0.6 0.0 - 0.9 %    Lymphocytes % 14.2 13.0 - 44.0 %    Monocytes % 9.1 2.0 - 10.0 %    Eosinophils % 1.5 0.0 - 6.0 %    Basophils % 1.3 0.0 - 2.0 %    Neutrophils Absolute 3.97 1.60 - 5.50 x10*3/uL    Immature Granulocytes Absolute, Automated 0.03 0.00 - 0.50 x10*3/uL    Lymphocytes Absolute 0.77 (L) 0.80 - 3.00 x10*3/uL    Monocytes Absolute 0.49 0.05 - 0.80 x10*3/uL    Eosinophils Absolute 0.08 0.00 - 0.40 x10*3/uL    Basophils Absolute 0.07 0.00 - 0.10 x10*3/uL   Magnesium   Result Value Ref Range    Magnesium 2.05 1.60 - 2.40 mg/dL   Comprehensive Metabolic Panel   Result Value Ref Range    Glucose 111 (H) 74 - 99 mg/dL    Sodium 141 136 - 145 mmol/L    Potassium 3.5 3.5 - 5.3 mmol/L    Chloride 108 (H) 98 - 107 mmol/L    Bicarbonate 24 21 - 32 mmol/L    Anion Gap 13 10 - 20 mmol/L    Urea Nitrogen 23 6 - 23 mg/dL    Creatinine 1.82 (H) 0.50 - 1.30 mg/dL    eGFR 35 (L) >60 mL/min/1.73m*2    Calcium 9.4 8.6 - 10.3 mg/dL    Albumin 3.6 3.4 - 5.0 g/dL    Alkaline Phosphatase 56 33 - 136 U/L    Total Protein 6.6 6.4 - 8.2 g/dL    AST 13 9 - 39 U/L    Bilirubin, Total 0.5 0.0 - 1.2 mg/dL    ALT 7 (L) 10 - 52 U/L   Creatine Kinase   Result Value Ref Range    Creatine Kinase 52 0 - 325 U/L   Lavender Top   Result Value Ref Range    Extra Tube Hold for add-ons.      Scheduled medications  Scheduled Medications[1]  Continuous medications  Continuous Medications[2]  PRN medications  PRN Medications[3]    Assessment/Plan   Assessment & Plan  Benign essential hypertension    Dyslipidemia    Paroxysmal A-fib (Multi)    Cellulitis of left elbow    Joint effusion of elbow, left  -Aspiration done 07/21, negative gram stain, culture: NGTD final, okay for discharge from orthopedic standpoint, please do not  hesitate to reach out for any further questions or concerns, thank you for allowing us to participate in this patient's care    -Antibiotics as recommended by ID service           I spent 15 minutes in the professional and overall care of this patient.      Eun Leon PA-C           [1] amLODIPine, 5 mg, oral, Daily  cefTRIAXone, 1 g, intravenous, q24h  lisinopril, 40 mg, oral, Daily  pantoprazole, 40 mg, oral, q PM  polyethylene glycol, 17 g, oral, Daily  QUEtiapine, 25 mg, oral, Nightly  rivaroxaban, 15 mg, oral, Daily with evening meal  simvastatin, 40 mg, oral, Nightly    [2]    [3] PRN medications: acetaminophen **OR** acetaminophen **OR** acetaminophen, melatonin

## 2025-07-24 NOTE — PROGRESS NOTES
Occupational Therapy    OT Treatment    Patient Name: Dash García  MRN: [id number]  Today's Date: 7/24/2025  Time Calculation  Start Time: 1325  Stop Time: 1405  Time Calculation (min): 40 min       3025/3025-A    Assessment:  End of Session Communication: Bedside nurse  End of Session Patient Position: Alarm on, Bed, 3 rail up       Plan:  OT Frequency: 4 times per week  OT Discharge Recommendations: Low intensity level of continued care          07/24/25 1325   OT Last Visit   OT Received On 07/24/25   General   Prior to Session Communication Bedside nurse   Patient Position Received Bed, 3 rail up;Alarm on   Preferred Learning Style verbal;visual   General Comment Pt pleasantly confused, RN cleared for therapy.   Pain Assessment   Pain Assessment 0-10   0-10 (Numeric) Pain Score 0 - No pain   Cognition   Orientation Level Disoriented to situation;Disoriented to time   Grooming   Grooming Level of Assistance Contact guard   Grooming Where Assessed Standing sinkside   LE Dressing   LE Dressing Yes   Pants Level of Assistance Minimum assistance   Sock Level of Assistance Close supervision   Toileting   Toileting Level of Assistance Minimum assistance   Where Assessed Toilet   Bed Mobility   Bed Mobility Yes   Bed Mobility 1   Bed Mobility 1 Supine to sitting   Level of Assistance 1 Close supervision   Bed Mobility 2   Bed Mobility  2 Sitting to supine   Level of Assistance 2 Close supervision   Transfers   Transfer Yes   Transfer 1   Transfer From 1 Sit to   Transfer to 1 Stand   Technique 1 Sit to stand;Stand to sit   Transfer Device 1 Walker;Gait belt   Transfer Level of Assistance 1 Contact guard   Trials/Comments 1 Pt completed multiple sit<>stands, FM performed within household distances CGA-Min A, slightly unsteady, demo'd/ educated on use of fww for safety and balance.   Toilet Transfers   Toilet Transfer From Bed   Toilet Transfer Type To and from   Toilet Transfer to Raised toilet seat with rails    Toilet Transfer Technique Ambulating   Toilet Transfers Contact guard   IP OT Assessment   End of Session Communication Bedside nurse   End of Session Patient Position Alarm on;Bed, 3 rail up   Inpatient Plan   OT Frequency 4 times per week   OT Discharge Recommendations Low intensity level of continued care       Outcome Measures:[facility] Daily Activity  Putting on and taking off regular lower body clothing: A little  Bathing (including washing, rinsing, drying): A little  Putting on and taking off regular upper body clothing: A little  Toileting, which includes using toilet, bedpan or urinal: A little  Taking care of personal grooming such as brushing teeth: A little  Eating Meals: A little  Daily Activity - Total Score: 18  Education Documentation  Body Mechanics, taught by MICKEY Samaniego at 7/24/2025  3:05 PM.  Learner: Patient  Readiness: Acceptance  Method: Explanation, Demonstration  Response: Verbalizes Understanding, Demonstrated Understanding, Needs Reinforcement    Precautions, taught by MICKEY Samaniego at 7/24/2025  3:05 PM.  Learner: Patient  Readiness: Acceptance  Method: Explanation, Demonstration  Response: Verbalizes Understanding, Demonstrated Understanding, Needs Reinforcement    ADL Training, taught by MICKEY Samaniego at 7/24/2025  3:05 PM.  Learner: Patient  Readiness: Acceptance  Method: Explanation, Demonstration  Response: Verbalizes Understanding, Demonstrated Understanding, Needs Reinforcement    Education Comments  No comments found.            Goals:  Encounter Problems       Encounter Problems (Active)       Dressings Lower Extremities       STG - Patient to complete lower body dressing MOD I (Progressing)       Start:  07/21/25    Expected End:  08/04/25               Functional Balance       LTG - Patient will maintain standing and sitting balance to allow for completion of daily activities (Progressing)       Start:  07/21/25    Expected End:   08/04/25               Grooming       STG - Patient completes grooming MOD I (Progressing)       Start:  07/21/25    Expected End:  08/04/25               OT Transfers       STG - Patient will perform toilet transfer MOD I (Progressing)       Start:  07/21/25    Expected End:  08/04/25

## 2025-07-24 NOTE — PROGRESS NOTES
Physical Therapy                 Therapy Communication Note    Patient Name: Dash García  MRN: [id number]  Department: [facility] 3 N  Room: [facility]3025-A  Today's Date: 7/24/2025     Discipline: Physical Therapy    Missed Visit:       Missed Visit Reason:      Missed Time: Attempt    Comment: Two attempts made to see pt this date. Pt was eating breakfast, then lunch. Will continue to see per POC.

## 2025-07-24 NOTE — DISCHARGE INSTRUCTIONS
You were treated for skin and soft tissue infection of your left elbow.  Please use ice and heat as well as keep the arm elevated and apply warm compresses.  You may use Tylenol as needed for pain.    Please follow up with your primary care provider within 7 days for hospital follow up. Please call to make this appointment.     Med changes:  Start Augmentin 500 mg tablets twice a day for the next 10 days.    Please take your medications as prescribed.     If you have any new or worsening symptoms seek medical attention.    Thank you for allowing us to participate in your care!    -[facility] Medicine Service.    You may receive a survey in the mail or electronically. We would greatly appreciate it if you would take the survey and provide honest feedback. We review every survey result that comes in, and use this information to enhance the care of our community. Thank you in advance, we appreciate your input.

## 2025-07-24 NOTE — DISCHARGE SUMMARY
Discharge Diagnosis  #Cellulitis in setting of L olecranon bursitis  #L elbow joint effusion  #unspecified dementia (Aox1 is baseline) c/b agitation at night     Issues Requiring Follow-Up  Augmentin 500mg bid x10d for SSTI  PCP FU    Discharge Meds     Medication List      START taking these medications     amoxicillin-clavulanate 500-125 mg tablet; Commonly known as: Augmentin;   Take 1 tablet by mouth 2 times a day for 10 days.     CHANGE how you take these medications     pantoprazole 40 mg EC tablet; Commonly known as: ProtoNix; TAKE ONE   TABLET BY MOUTH EVERY DAY; What changed: when to take this     CONTINUE taking these medications     amLODIPine 5 mg tablet; Commonly known as: Norvasc; TAKE ONE TABLET BY   MOUTH EVERY DAY   lisinopril 40 mg tablet; TAKE ONE TABLET BY MOUTH EVERY DAY   pyridoxine 100 mg tablet; Commonly known as: Vitamin B-6   simvastatin 40 mg tablet; Commonly known as: Zocor   Xarelto 15 mg tablet; Generic drug: rivaroxaban; Take 1 tablet (15 mg)   by mouth once daily in the evening. Take with meals. Take with food.       Test Results Pending At Discharge  Pending Labs       Order Current Status    Blood Culture Preliminary result    Blood Culture Preliminary result            Hospital Course  Mr. Dash García is a 89yoM [employer]  w/ PMHx of HTN, afib on Xarelto, HLD, unspecified dementia (Aox1 is baseline) who presented for generalized weakness and L elbow swelling, pain, tenderness and overlying erythema worsening over several days. Admitted for c/f cellulitis in setting of L olecranon bursitis.     Underwent joint aspiration of left elbow on day 1 of admission, culture without growth.  MRI done to rule out abscess and was negative for abscess, consistent with joint effusion overlying cellulitis.  CRP and ESR are significant elevated.  ID was consulted and managed with IV antibiotics while admitted and discharged on Augmentin for 10 days renally dosed.    Of note, was quite  agitated at night.  Does have suspected dementia though this is not officially diagnosed.  Will need PCP follow-up and consider starting medication such as memantine.  discharged to home in medically optimized condition with home health care referral    Pertinent Physical Exam At Time of Discharge  Physical Exam  GENERAL: Alert and oriented x1. No acute distress. elderly  EYES: Anicteric. R pupil nonreactive (known chronic finding), L equal and reactive  HENT: Moist mucous membranes. No scleral icterus.   LUNGS: CTA BL. No accessory muscle use.  CV: RRR. No murmur. No JVD.  ABDOMEN: Soft, non-tender and non-distended. No palpable masses. BS+ x4  EXTREMITIES: No edema. Non-tender.?L elbow significantly less swollen, erythematous and no pain with passive motion today.  SKIN: No rashes or lesions. Warm.  NEUROLOGIC: No focal neurological deficits.  PSYCHIATRIC: Cooperative.     Outpatient Follow-Up  Future Appointments   Date Time Provider Department Center   8/13/2025 10:00 AM Miesha Hicks PharmD [id number]   11/20/2025 11:45 AM Dash Quick MD [id number]     45 minutes were spent in discharge planning with >50% of that time spent in direct patient counseling and coordination of care.      Fadumo Lisa DO   No

## 2025-07-24 NOTE — ASSESSMENT & PLAN NOTE
-Aspiration done 07/21, negative gram stain, culture: NGTD final, okay for discharge from orthopedic standpoint, please do not hesitate to reach out for any further questions or concerns, thank you for allowing us to participate in this patient's care    -Antibiotics as recommended by ID service

## 2025-07-24 NOTE — NURSING NOTE
1530 Wife at bedside, discharge instructions given, verbalized understanding. Meds to beds delivered meds for home.   1535 Discharged to home with wife via wheelchair. Walker adjusted by PT and taken by pt. No decline in assessment. Injury free throughout shift.

## 2025-07-24 NOTE — PROGRESS NOTES
07/24/25 1400   Discharge Planning   Home or Post Acute Services In home services   Type of Home Care Services Home health aide;Home OT;Home PT   Expected Discharge Disposition Home H  ([facility])   Intensity of Service   Intensity of Service >30 min     Notified [facility] intake nurse that patient to d/c home today per attending. Provided patient with walker.     7527- Updated pt's wife Tarah that discharge orders are complete. She will come  patient- updated nursing. Walker form emailed and faxed to [email or web address].

## 2025-07-24 NOTE — HH CARE COORDINATION
Home Care received a Referral for Physical Therapy, Occupational Therapy, and Home Health Aide. We have processed the referral for a Start of Care within 48 hours of 7/26/25.     If you have any questions or concerns, please feel free to contact us at [phone]. Follow the prompts, enter your five digit zip code, and you will be directed to your care team on [facility].

## 2025-07-25 LAB
BACTERIA BLD CULT: NORMAL
BACTERIA BLD CULT: NORMAL

## 2025-07-27 ENCOUNTER — APPOINTMENT (OUTPATIENT)
Dept: HOME HEALTH SERVICES | Facility: HOME HEALTH | Age: 89
End: 2025-07-27
Payer: MEDICARE

## 2025-07-28 ENCOUNTER — HOME CARE VISIT (OUTPATIENT)
Dept: HOME HEALTH SERVICES | Facility: HOME HEALTH | Age: 89
End: 2025-07-28

## 2025-07-28 ASSESSMENT — ACTIVITIES OF DAILY LIVING (ADL)
FEEDING_WITHIN_DEFINED_LIMITS: 1
DRESSING_LB_CURRENT_FUNCTION: INDEPENDENT
TOILETING: INDEPENDENT
GROOMING_WITHIN_DEFINED_LIMITS: 1
TOILETING: 1

## 2025-07-28 ASSESSMENT — ENCOUNTER SYMPTOMS
DENIES PAIN: 1
PERSON REPORTING PAIN: PATIENT
OCCASIONAL FEELINGS OF UNSTEADINESS: 0
LOSS OF SENSATION IN FEET: 0
FORGETFULNESS: 1
DEPRESSION: 0
DESCRIPTION OF MEMORY LOSS: SHORT TERM

## 2025-08-13 ENCOUNTER — APPOINTMENT (OUTPATIENT)
Dept: PHARMACY | Facility: HOSPITAL | Age: 89
End: 2025-08-13
Payer: MEDICARE

## 2025-08-25 ENCOUNTER — APPOINTMENT (OUTPATIENT)
Dept: PHARMACY | Facility: HOSPITAL | Age: 89
End: 2025-08-25
Payer: MEDICARE

## 2025-08-25 DIAGNOSIS — I48.0 PAROXYSMAL A-FIB (MULTI): ICD-10-CM

## 2025-08-25 RX ORDER — DONEPEZIL HYDROCHLORIDE 5 MG/1
5 TABLET, FILM COATED ORAL NIGHTLY
COMMUNITY

## 2025-09-06 LAB
Q ONSET: 224 MS
QRS COUNT: 11 BEATS
QRS DURATION: 152 MS
QT INTERVAL: 420 MS
QTC CALCULATION(BAZETT): 459 MS
QTC FREDERICIA: 446 MS
R AXIS: 147 DEGREES
T AXIS: 62 DEGREES
T OFFSET: 434 MS
VENTRICULAR RATE: 72 BPM

## 2025-09-07 LAB
Q ONSET: 223 MS
QRS COUNT: 10 BEATS
QRS DURATION: 154 MS
QT INTERVAL: 436 MS
QTC CALCULATION(BAZETT): 438 MS
QTC FREDERICIA: 438 MS
R AXIS: 120 DEGREES
T AXIS: 56 DEGREES
T OFFSET: 441 MS
VENTRICULAR RATE: 61 BPM

## 2026-01-12 ENCOUNTER — APPOINTMENT (OUTPATIENT)
Dept: PHARMACY | Facility: HOSPITAL | Age: 89
End: 2026-01-12
Payer: MEDICARE